# Patient Record
Sex: FEMALE | Race: WHITE | NOT HISPANIC OR LATINO | Employment: OTHER | URBAN - METROPOLITAN AREA
[De-identification: names, ages, dates, MRNs, and addresses within clinical notes are randomized per-mention and may not be internally consistent; named-entity substitution may affect disease eponyms.]

---

## 2017-09-15 ENCOUNTER — CONVERSION ENCOUNTER (OUTPATIENT)
Dept: GENERAL RADIOLOGY | Facility: HOSPITAL | Age: 62
End: 2017-09-15

## 2018-04-30 ENCOUNTER — OFFICE VISIT CONVERTED (OUTPATIENT)
Dept: FAMILY MEDICINE CLINIC | Facility: CLINIC | Age: 63
End: 2018-04-30
Attending: NURSE PRACTITIONER

## 2018-08-15 ENCOUNTER — OFFICE VISIT CONVERTED (OUTPATIENT)
Dept: FAMILY MEDICINE CLINIC | Facility: CLINIC | Age: 63
End: 2018-08-15
Attending: NURSE PRACTITIONER

## 2018-08-15 ENCOUNTER — CONVERSION ENCOUNTER (OUTPATIENT)
Dept: FAMILY MEDICINE CLINIC | Facility: CLINIC | Age: 63
End: 2018-08-15

## 2019-02-27 ENCOUNTER — OFFICE VISIT CONVERTED (OUTPATIENT)
Dept: FAMILY MEDICINE CLINIC | Facility: CLINIC | Age: 64
End: 2019-02-27
Attending: NURSE PRACTITIONER

## 2019-02-27 ENCOUNTER — CONVERSION ENCOUNTER (OUTPATIENT)
Dept: FAMILY MEDICINE CLINIC | Facility: CLINIC | Age: 64
End: 2019-02-27

## 2019-03-07 ENCOUNTER — HOSPITAL ENCOUNTER (OUTPATIENT)
Dept: LAB | Facility: HOSPITAL | Age: 64
Discharge: HOME | End: 2019-03-07
Attending: NURSE PRACTITIONER

## 2019-03-07 LAB
ALBUMIN SERPL-MCNC: 4.3 G/DL (ref 3.5–5)
ALBUMIN/GLOB SERPL: 1.7 {RATIO} (ref 1.4–2.6)
ALP SERPL-CCNC: 67 U/L (ref 43–160)
ALT SERPL-CCNC: 18 U/L (ref 10–40)
ANION GAP SERPL CALC-SCNC: 15 MMOL/L (ref 8–19)
AST SERPL-CCNC: 17 U/L (ref 15–50)
BASOPHILS # BLD AUTO: 0.05 10*3/UL (ref 0–0.2)
BASOPHILS NFR BLD AUTO: 1 % (ref 0–3)
BILIRUB SERPL-MCNC: 0.62 MG/DL (ref 0.2–1.3)
BUN SERPL-MCNC: 18 MG/DL (ref 5–25)
BUN/CREAT SERPL: 19 {RATIO} (ref 6–20)
CALCIUM SERPL-MCNC: 9.9 MG/DL (ref 8.7–10.4)
CHLORIDE SERPL-SCNC: 105 MMOL/L (ref 99–111)
CHOLEST SERPL-MCNC: 179 MG/DL (ref 107–200)
CHOLEST/HDLC SERPL: 2.5 {RATIO} (ref 3–6)
CONV ABS IMM GRAN: 0.01 10*3/UL (ref 0–0.2)
CONV CO2: 27 MMOL/L (ref 22–32)
CONV IMMATURE GRAN: 0.2 % (ref 0–1.8)
CONV TOTAL PROTEIN: 6.9 G/DL (ref 6.3–8.2)
CREAT UR-MCNC: 0.95 MG/DL (ref 0.5–0.9)
DEPRECATED RDW RBC AUTO: 48.8 FL (ref 36.4–46.3)
EOSINOPHIL # BLD AUTO: 0.19 10*3/UL (ref 0–0.7)
EOSINOPHIL # BLD AUTO: 3.9 % (ref 0–7)
ERYTHROCYTE [DISTWIDTH] IN BLOOD BY AUTOMATED COUNT: 13.3 % (ref 11.7–14.4)
GFR SERPLBLD BASED ON 1.73 SQ M-ARVRAT: >60 ML/MIN/{1.73_M2}
GLOBULIN UR ELPH-MCNC: 2.6 G/DL (ref 2–3.5)
GLUCOSE SERPL-MCNC: 93 MG/DL (ref 65–99)
HBA1C MFR BLD: 13.3 G/DL (ref 12–16)
HCT VFR BLD AUTO: 42.1 % (ref 37–47)
HDLC SERPL-MCNC: 71 MG/DL (ref 40–60)
LDLC SERPL CALC-MCNC: 94 MG/DL (ref 70–100)
LYMPHOCYTES # BLD AUTO: 1.12 10*3/UL (ref 1–5)
MCH RBC QN AUTO: 31.2 PG (ref 27–31)
MCHC RBC AUTO-ENTMCNC: 31.6 G/DL (ref 33–37)
MCV RBC AUTO: 98.8 FL (ref 81–99)
MONOCYTES # BLD AUTO: 0.42 10*3/UL (ref 0.2–1.2)
MONOCYTES NFR BLD AUTO: 8.6 % (ref 3–10)
NEUTROPHILS # BLD AUTO: 3.1 10*3/UL (ref 2–8)
NEUTROPHILS NFR BLD AUTO: 63.4 % (ref 30–85)
NRBC CBCN: 0 % (ref 0–0.7)
OSMOLALITY SERPL CALC.SUM OF ELEC: 298 MOSM/KG (ref 273–304)
PLATELET # BLD AUTO: 236 10*3/UL (ref 130–400)
PMV BLD AUTO: 11.9 FL (ref 9.4–12.3)
POTASSIUM SERPL-SCNC: 4.3 MMOL/L (ref 3.5–5.3)
RBC # BLD AUTO: 4.26 10*6/UL (ref 4.2–5.4)
SODIUM SERPL-SCNC: 143 MMOL/L (ref 135–147)
T4 FREE SERPL-MCNC: 1.4 NG/DL (ref 0.9–1.8)
TRIGL SERPL-MCNC: 69 MG/DL (ref 40–150)
TSH SERPL-ACNC: 2.66 M[IU]/L (ref 0.27–4.2)
VARIANT LYMPHS NFR BLD MANUAL: 22.9 % (ref 20–45)
VLDLC SERPL-MCNC: 14 MG/DL (ref 5–37)
WBC # BLD AUTO: 4.89 10*3/UL (ref 4.8–10.8)

## 2019-05-08 ENCOUNTER — HOSPITAL ENCOUNTER (OUTPATIENT)
Dept: GENERAL RADIOLOGY | Facility: HOSPITAL | Age: 64
Discharge: HOME | End: 2019-05-08
Attending: NURSE PRACTITIONER

## 2019-07-17 ENCOUNTER — OFFICE VISIT CONVERTED (OUTPATIENT)
Dept: FAMILY MEDICINE CLINIC | Facility: CLINIC | Age: 64
End: 2019-07-17
Attending: NURSE PRACTITIONER

## 2019-07-17 ENCOUNTER — CONVERSION ENCOUNTER (OUTPATIENT)
Dept: FAMILY MEDICINE CLINIC | Facility: CLINIC | Age: 64
End: 2019-07-17

## 2019-07-17 ENCOUNTER — HOSPITAL ENCOUNTER (OUTPATIENT)
Dept: FAMILY MEDICINE CLINIC | Facility: CLINIC | Age: 64
Discharge: HOME | End: 2019-07-17
Attending: NURSE PRACTITIONER

## 2019-07-19 LAB — BACTERIA UR CULT: NORMAL

## 2019-08-21 ENCOUNTER — OFFICE VISIT CONVERTED (OUTPATIENT)
Dept: FAMILY MEDICINE CLINIC | Facility: CLINIC | Age: 64
End: 2019-08-21
Attending: NURSE PRACTITIONER

## 2019-09-10 ENCOUNTER — HOSPITAL ENCOUNTER (OUTPATIENT)
Dept: GENERAL RADIOLOGY | Facility: HOSPITAL | Age: 64
Discharge: HOME | End: 2019-09-10
Attending: NURSE PRACTITIONER

## 2019-09-10 LAB
ALBUMIN SERPL-MCNC: 4.4 G/DL (ref 3.5–5)
ALBUMIN/GLOB SERPL: 1.6 {RATIO} (ref 1.4–2.6)
ALP SERPL-CCNC: 72 U/L (ref 43–160)
ALT SERPL-CCNC: 32 U/L (ref 10–40)
ANION GAP SERPL CALC-SCNC: 18 MMOL/L (ref 8–19)
AST SERPL-CCNC: 34 U/L (ref 15–50)
BASOPHILS # BLD AUTO: 0.04 10*3/UL (ref 0–0.2)
BASOPHILS NFR BLD AUTO: 0.9 % (ref 0–3)
BILIRUB SERPL-MCNC: 0.63 MG/DL (ref 0.2–1.3)
BUN SERPL-MCNC: 13 MG/DL (ref 5–25)
BUN/CREAT SERPL: 16 {RATIO} (ref 6–20)
CALCIUM SERPL-MCNC: 9.3 MG/DL (ref 8.7–10.4)
CHLORIDE SERPL-SCNC: 108 MMOL/L (ref 99–111)
CHOLEST SERPL-MCNC: 201 MG/DL (ref 107–200)
CHOLEST/HDLC SERPL: 2.7 {RATIO} (ref 3–6)
CONV ABS IMM GRAN: 0.01 10*3/UL (ref 0–0.2)
CONV CO2: 23 MMOL/L (ref 22–32)
CONV IMMATURE GRAN: 0.2 % (ref 0–1.8)
CONV TOTAL PROTEIN: 7.1 G/DL (ref 6.3–8.2)
CREAT UR-MCNC: 0.8 MG/DL (ref 0.5–0.9)
DEPRECATED RDW RBC AUTO: 52.9 FL (ref 36.4–46.3)
EOSINOPHIL # BLD AUTO: 0.33 10*3/UL (ref 0–0.7)
EOSINOPHIL # BLD AUTO: 7.1 % (ref 0–7)
ERYTHROCYTE [DISTWIDTH] IN BLOOD BY AUTOMATED COUNT: 14.6 % (ref 11.7–14.4)
GFR SERPLBLD BASED ON 1.73 SQ M-ARVRAT: >60 ML/MIN/{1.73_M2}
GLOBULIN UR ELPH-MCNC: 2.7 G/DL (ref 2–3.5)
GLUCOSE SERPL-MCNC: 93 MG/DL (ref 65–99)
HCT VFR BLD AUTO: 38.3 % (ref 37–47)
HDLC SERPL-MCNC: 74 MG/DL (ref 40–60)
HGB BLD-MCNC: 12.6 G/DL (ref 12–16)
LDLC SERPL CALC-MCNC: 115 MG/DL (ref 70–100)
LYMPHOCYTES # BLD AUTO: 1.22 10*3/UL (ref 1–5)
LYMPHOCYTES NFR BLD AUTO: 26.3 % (ref 20–45)
MCH RBC QN AUTO: 32.5 PG (ref 27–31)
MCHC RBC AUTO-ENTMCNC: 32.9 G/DL (ref 33–37)
MCV RBC AUTO: 98.7 FL (ref 81–99)
MONOCYTES # BLD AUTO: 0.53 10*3/UL (ref 0.2–1.2)
MONOCYTES NFR BLD AUTO: 11.4 % (ref 3–10)
NEUTROPHILS # BLD AUTO: 2.51 10*3/UL (ref 2–8)
NEUTROPHILS NFR BLD AUTO: 54.1 % (ref 30–85)
NRBC CBCN: 0 % (ref 0–0.7)
OSMOLALITY SERPL CALC.SUM OF ELEC: 298 MOSM/KG (ref 273–304)
PLATELET # BLD AUTO: 241 10*3/UL (ref 130–400)
PMV BLD AUTO: 12 FL (ref 9.4–12.3)
POTASSIUM SERPL-SCNC: 4.6 MMOL/L (ref 3.5–5.3)
RBC # BLD AUTO: 3.88 10*6/UL (ref 4.2–5.4)
SODIUM SERPL-SCNC: 144 MMOL/L (ref 135–147)
T4 FREE SERPL-MCNC: 1.2 NG/DL (ref 0.9–1.8)
TRIGL SERPL-MCNC: 60 MG/DL (ref 40–150)
TSH SERPL-ACNC: 2.87 M[IU]/L (ref 0.27–4.2)
VLDLC SERPL-MCNC: 12 MG/DL (ref 5–37)
WBC # BLD AUTO: 4.64 10*3/UL (ref 4.8–10.8)

## 2019-10-22 ENCOUNTER — HOSPITAL ENCOUNTER (OUTPATIENT)
Dept: LAB | Facility: HOSPITAL | Age: 64
Discharge: HOME | End: 2019-10-22
Attending: NURSE PRACTITIONER

## 2019-10-22 LAB
BASOPHILS # BLD AUTO: 0.03 10*3/UL (ref 0–0.2)
BASOPHILS NFR BLD AUTO: 0.5 % (ref 0–3)
CONV ABS IMM GRAN: 0.01 10*3/UL (ref 0–0.2)
CONV IMMATURE GRAN: 0.2 % (ref 0–1.8)
DEPRECATED RDW RBC AUTO: 51.2 FL (ref 36.4–46.3)
EOSINOPHIL # BLD AUTO: 0.2 10*3/UL (ref 0–0.7)
EOSINOPHIL # BLD AUTO: 3.6 % (ref 0–7)
ERYTHROCYTE [DISTWIDTH] IN BLOOD BY AUTOMATED COUNT: 13.9 % (ref 11.7–14.4)
HCT VFR BLD AUTO: 42.4 % (ref 37–47)
HGB BLD-MCNC: 13.6 G/DL (ref 12–16)
LYMPHOCYTES # BLD AUTO: 1.16 10*3/UL (ref 1–5)
LYMPHOCYTES NFR BLD AUTO: 21.1 % (ref 20–45)
MCH RBC QN AUTO: 32.2 PG (ref 27–31)
MCHC RBC AUTO-ENTMCNC: 32.1 G/DL (ref 33–37)
MCV RBC AUTO: 100.2 FL (ref 81–99)
MONOCYTES # BLD AUTO: 0.51 10*3/UL (ref 0.2–1.2)
MONOCYTES NFR BLD AUTO: 9.3 % (ref 3–10)
NEUTROPHILS # BLD AUTO: 3.58 10*3/UL (ref 2–8)
NEUTROPHILS NFR BLD AUTO: 65.3 % (ref 30–85)
NRBC CBCN: 0 % (ref 0–0.7)
PLATELET # BLD AUTO: 249 10*3/UL (ref 130–400)
PMV BLD AUTO: 11.5 FL (ref 9.4–12.3)
RBC # BLD AUTO: 4.23 10*6/UL (ref 4.2–5.4)
WBC # BLD AUTO: 5.49 10*3/UL (ref 4.8–10.8)

## 2019-11-11 ENCOUNTER — HOSPITAL ENCOUNTER (OUTPATIENT)
Dept: GENERAL RADIOLOGY | Facility: HOSPITAL | Age: 64
Discharge: HOME | End: 2019-11-11
Attending: NURSE PRACTITIONER

## 2020-02-20 ENCOUNTER — OFFICE VISIT CONVERTED (OUTPATIENT)
Dept: FAMILY MEDICINE CLINIC | Facility: CLINIC | Age: 65
End: 2020-02-20
Attending: NURSE PRACTITIONER

## 2020-02-20 ENCOUNTER — CONVERSION ENCOUNTER (OUTPATIENT)
Dept: FAMILY MEDICINE CLINIC | Facility: CLINIC | Age: 65
End: 2020-02-20

## 2020-02-25 ENCOUNTER — HOSPITAL ENCOUNTER (OUTPATIENT)
Dept: LAB | Facility: HOSPITAL | Age: 65
Discharge: HOME | End: 2020-02-25
Attending: NURSE PRACTITIONER

## 2020-02-25 LAB
ALBUMIN SERPL-MCNC: 4.5 G/DL (ref 3.5–5)
ALBUMIN/GLOB SERPL: 1.8 {RATIO} (ref 1.4–2.6)
ALP SERPL-CCNC: 69 U/L (ref 43–160)
ALT SERPL-CCNC: 24 U/L (ref 10–40)
ANION GAP SERPL CALC-SCNC: 17 MMOL/L (ref 8–19)
AST SERPL-CCNC: 20 U/L (ref 15–50)
BASOPHILS # BLD AUTO: 0.04 10*3/UL (ref 0–0.2)
BASOPHILS NFR BLD AUTO: 0.9 % (ref 0–3)
BILIRUB SERPL-MCNC: 0.54 MG/DL (ref 0.2–1.3)
BUN SERPL-MCNC: 17 MG/DL (ref 5–25)
BUN/CREAT SERPL: 19 {RATIO} (ref 6–20)
CALCIUM SERPL-MCNC: 9.5 MG/DL (ref 8.7–10.4)
CHLORIDE SERPL-SCNC: 105 MMOL/L (ref 99–111)
CHOLEST SERPL-MCNC: 179 MG/DL (ref 107–200)
CHOLEST/HDLC SERPL: 2.5 {RATIO} (ref 3–6)
CONV ABS IMM GRAN: 0.01 10*3/UL (ref 0–0.2)
CONV CO2: 24 MMOL/L (ref 22–32)
CONV IMMATURE GRAN: 0.2 % (ref 0–1.8)
CONV TOTAL PROTEIN: 7 G/DL (ref 6.3–8.2)
CREAT UR-MCNC: 0.91 MG/DL (ref 0.5–0.9)
DEPRECATED RDW RBC AUTO: 49.7 FL (ref 36.4–46.3)
EOSINOPHIL # BLD AUTO: 0.17 10*3/UL (ref 0–0.7)
EOSINOPHIL # BLD AUTO: 3.9 % (ref 0–7)
ERYTHROCYTE [DISTWIDTH] IN BLOOD BY AUTOMATED COUNT: 13.7 % (ref 11.7–14.4)
GFR SERPLBLD BASED ON 1.73 SQ M-ARVRAT: >60 ML/MIN/{1.73_M2}
GLOBULIN UR ELPH-MCNC: 2.5 G/DL (ref 2–3.5)
GLUCOSE SERPL-MCNC: 100 MG/DL (ref 65–99)
HCT VFR BLD AUTO: 40.5 % (ref 37–47)
HDLC SERPL-MCNC: 72 MG/DL (ref 40–60)
HGB BLD-MCNC: 13.1 G/DL (ref 12–16)
LDLC SERPL CALC-MCNC: 97 MG/DL (ref 70–100)
LYMPHOCYTES # BLD AUTO: 1.26 10*3/UL (ref 1–5)
LYMPHOCYTES NFR BLD AUTO: 29.2 % (ref 20–45)
MCH RBC QN AUTO: 31.6 PG (ref 27–31)
MCHC RBC AUTO-ENTMCNC: 32.3 G/DL (ref 33–37)
MCV RBC AUTO: 97.8 FL (ref 81–99)
MONOCYTES # BLD AUTO: 0.42 10*3/UL (ref 0.2–1.2)
MONOCYTES NFR BLD AUTO: 9.7 % (ref 3–10)
NEUTROPHILS # BLD AUTO: 2.42 10*3/UL (ref 2–8)
NEUTROPHILS NFR BLD AUTO: 56.1 % (ref 30–85)
NRBC CBCN: 0 % (ref 0–0.7)
OSMOLALITY SERPL CALC.SUM OF ELEC: 296 MOSM/KG (ref 273–304)
PLATELET # BLD AUTO: 236 10*3/UL (ref 130–400)
PMV BLD AUTO: 11.3 FL (ref 9.4–12.3)
POTASSIUM SERPL-SCNC: 4.4 MMOL/L (ref 3.5–5.3)
RBC # BLD AUTO: 4.14 10*6/UL (ref 4.2–5.4)
SODIUM SERPL-SCNC: 142 MMOL/L (ref 135–147)
T4 FREE SERPL-MCNC: 1.3 NG/DL (ref 0.9–1.8)
TRIGL SERPL-MCNC: 52 MG/DL (ref 40–150)
TSH SERPL-ACNC: 3.21 M[IU]/L (ref 0.27–4.2)
VLDLC SERPL-MCNC: 10 MG/DL (ref 5–37)
WBC # BLD AUTO: 4.32 10*3/UL (ref 4.8–10.8)

## 2020-02-26 LAB
CONV HEPATITIS C AB WITH REFLEX TO CONFIRMATION: <0.1 S/CO RATIO (ref 0–0.9)
CONV HEPATITIS COMMENT: NORMAL

## 2020-05-05 ENCOUNTER — TELEMEDICINE CONVERTED (OUTPATIENT)
Dept: FAMILY MEDICINE CLINIC | Facility: CLINIC | Age: 65
End: 2020-05-05
Attending: NURSE PRACTITIONER

## 2020-08-11 ENCOUNTER — HOSPITAL ENCOUNTER (OUTPATIENT)
Dept: LAB | Facility: HOSPITAL | Age: 65
Discharge: HOME | End: 2020-08-11
Attending: NURSE PRACTITIONER

## 2020-08-11 LAB
ALBUMIN SERPL-MCNC: 4.5 G/DL (ref 3.5–5)
ALBUMIN/GLOB SERPL: 1.7 {RATIO} (ref 1.4–2.6)
ALP SERPL-CCNC: 67 U/L (ref 43–160)
ALT SERPL-CCNC: 30 U/L (ref 10–40)
ANION GAP SERPL CALC-SCNC: 21 MMOL/L (ref 8–19)
AST SERPL-CCNC: 29 U/L (ref 15–50)
BASOPHILS # BLD AUTO: 0.05 10*3/UL (ref 0–0.2)
BASOPHILS NFR BLD AUTO: 1 % (ref 0–3)
BILIRUB SERPL-MCNC: 0.47 MG/DL (ref 0.2–1.3)
BUN SERPL-MCNC: 14 MG/DL (ref 5–25)
BUN/CREAT SERPL: 14 {RATIO} (ref 6–20)
CALCIUM SERPL-MCNC: 10.8 MG/DL (ref 8.7–10.4)
CHLORIDE SERPL-SCNC: 105 MMOL/L (ref 99–111)
CHOLEST SERPL-MCNC: 183 MG/DL (ref 107–200)
CHOLEST/HDLC SERPL: 2.7 {RATIO} (ref 3–6)
CONV ABS IMM GRAN: 0.01 10*3/UL (ref 0–0.2)
CONV CO2: 22 MMOL/L (ref 22–32)
CONV IMMATURE GRAN: 0.2 % (ref 0–1.8)
CONV TOTAL PROTEIN: 7.2 G/DL (ref 6.3–8.2)
CREAT UR-MCNC: 0.99 MG/DL (ref 0.5–0.9)
DEPRECATED RDW RBC AUTO: 52 FL (ref 36.4–46.3)
EOSINOPHIL # BLD AUTO: 0.24 10*3/UL (ref 0–0.7)
EOSINOPHIL # BLD AUTO: 4.9 % (ref 0–7)
ERYTHROCYTE [DISTWIDTH] IN BLOOD BY AUTOMATED COUNT: 14.1 % (ref 11.7–14.4)
GFR SERPLBLD BASED ON 1.73 SQ M-ARVRAT: 59 ML/MIN/{1.73_M2}
GLOBULIN UR ELPH-MCNC: 2.7 G/DL (ref 2–3.5)
GLUCOSE SERPL-MCNC: 100 MG/DL (ref 65–99)
HCT VFR BLD AUTO: 41.4 % (ref 37–47)
HDLC SERPL-MCNC: 68 MG/DL (ref 40–60)
HGB BLD-MCNC: 13.3 G/DL (ref 12–16)
LDLC SERPL CALC-MCNC: 100 MG/DL (ref 70–100)
LYMPHOCYTES # BLD AUTO: 1.3 10*3/UL (ref 1–5)
LYMPHOCYTES NFR BLD AUTO: 26.5 % (ref 20–45)
MCH RBC QN AUTO: 32 PG (ref 27–31)
MCHC RBC AUTO-ENTMCNC: 32.1 G/DL (ref 33–37)
MCV RBC AUTO: 99.5 FL (ref 81–99)
MONOCYTES # BLD AUTO: 0.45 10*3/UL (ref 0.2–1.2)
MONOCYTES NFR BLD AUTO: 9.2 % (ref 3–10)
NEUTROPHILS # BLD AUTO: 2.85 10*3/UL (ref 2–8)
NEUTROPHILS NFR BLD AUTO: 58.2 % (ref 30–85)
NRBC CBCN: 0 % (ref 0–0.7)
OSMOLALITY SERPL CALC.SUM OF ELEC: 297 MOSM/KG (ref 273–304)
PLATELET # BLD AUTO: 243 10*3/UL (ref 130–400)
PMV BLD AUTO: 12.1 FL (ref 9.4–12.3)
POTASSIUM SERPL-SCNC: 4.7 MMOL/L (ref 3.5–5.3)
RBC # BLD AUTO: 4.16 10*6/UL (ref 4.2–5.4)
SODIUM SERPL-SCNC: 143 MMOL/L (ref 135–147)
T4 FREE SERPL-MCNC: 1.3 NG/DL (ref 0.9–1.8)
TRIGL SERPL-MCNC: 74 MG/DL (ref 40–150)
TSH SERPL-ACNC: 3.47 M[IU]/L (ref 0.27–4.2)
VLDLC SERPL-MCNC: 15 MG/DL (ref 5–37)
WBC # BLD AUTO: 4.9 10*3/UL (ref 4.8–10.8)

## 2020-08-20 ENCOUNTER — TELEMEDICINE CONVERTED (OUTPATIENT)
Dept: FAMILY MEDICINE CLINIC | Facility: CLINIC | Age: 65
End: 2020-08-20
Attending: NURSE PRACTITIONER

## 2021-01-27 ENCOUNTER — TELEMEDICINE CONVERTED (OUTPATIENT)
Dept: FAMILY MEDICINE CLINIC | Facility: CLINIC | Age: 66
End: 2021-01-27
Attending: NURSE PRACTITIONER

## 2021-02-11 ENCOUNTER — TELEMEDICINE CONVERTED (OUTPATIENT)
Dept: FAMILY MEDICINE CLINIC | Facility: CLINIC | Age: 66
End: 2021-02-11
Attending: NURSE PRACTITIONER

## 2021-02-22 ENCOUNTER — CONVERSION ENCOUNTER (OUTPATIENT)
Dept: GASTROENTEROLOGY | Facility: CLINIC | Age: 66
End: 2021-02-22
Attending: INTERNAL MEDICINE

## 2021-03-09 ENCOUNTER — HOSPITAL ENCOUNTER (OUTPATIENT)
Dept: LAB | Facility: HOSPITAL | Age: 66
Discharge: HOME | End: 2021-03-09
Attending: NURSE PRACTITIONER

## 2021-03-09 LAB
ALBUMIN SERPL-MCNC: 4.3 G/DL (ref 3.5–5)
ALBUMIN/GLOB SERPL: 1.6 {RATIO} (ref 1.4–2.6)
ALP SERPL-CCNC: 63 U/L (ref 43–160)
ALT SERPL-CCNC: 14 U/L (ref 10–40)
ANION GAP SERPL CALC-SCNC: 15 MMOL/L (ref 8–19)
AST SERPL-CCNC: 18 U/L (ref 15–50)
BASOPHILS # BLD AUTO: 0.04 10*3/UL (ref 0–0.2)
BASOPHILS NFR BLD AUTO: 0.5 % (ref 0–3)
BILIRUB SERPL-MCNC: 0.52 MG/DL (ref 0.2–1.3)
BUN SERPL-MCNC: 15 MG/DL (ref 5–25)
BUN/CREAT SERPL: 18 {RATIO} (ref 6–20)
CALCIUM SERPL-MCNC: 10 MG/DL (ref 8.7–10.4)
CHLORIDE SERPL-SCNC: 103 MMOL/L (ref 99–111)
CHOLEST SERPL-MCNC: 153 MG/DL (ref 107–200)
CHOLEST/HDLC SERPL: 2.5 {RATIO} (ref 3–6)
CONV ABS IMM GRAN: 0.02 10*3/UL (ref 0–0.2)
CONV CO2: 26 MMOL/L (ref 22–32)
CONV IMMATURE GRAN: 0.2 % (ref 0–1.8)
CONV TOTAL PROTEIN: 7 G/DL (ref 6.3–8.2)
CREAT UR-MCNC: 0.85 MG/DL (ref 0.5–0.9)
DEPRECATED RDW RBC AUTO: 53.5 FL (ref 36.4–46.3)
EOSINOPHIL # BLD AUTO: 0.18 10*3/UL (ref 0–0.7)
EOSINOPHIL # BLD AUTO: 2.2 % (ref 0–7)
ERYTHROCYTE [DISTWIDTH] IN BLOOD BY AUTOMATED COUNT: 14.6 % (ref 11.7–14.4)
GFR SERPLBLD BASED ON 1.73 SQ M-ARVRAT: >60 ML/MIN/{1.73_M2}
GLOBULIN UR ELPH-MCNC: 2.7 G/DL (ref 2–3.5)
GLUCOSE SERPL-MCNC: 88 MG/DL (ref 65–99)
HCT VFR BLD AUTO: 38.9 % (ref 37–47)
HDLC SERPL-MCNC: 62 MG/DL (ref 40–60)
HGB BLD-MCNC: 12.6 G/DL (ref 12–16)
LDLC SERPL CALC-MCNC: 76 MG/DL (ref 70–100)
LYMPHOCYTES # BLD AUTO: 1.35 10*3/UL (ref 1–5)
LYMPHOCYTES NFR BLD AUTO: 16.2 % (ref 20–45)
MCH RBC QN AUTO: 31.9 PG (ref 27–31)
MCHC RBC AUTO-ENTMCNC: 32.4 G/DL (ref 33–37)
MCV RBC AUTO: 98.5 FL (ref 81–99)
MONOCYTES # BLD AUTO: 0.67 10*3/UL (ref 0.2–1.2)
MONOCYTES NFR BLD AUTO: 8 % (ref 3–10)
NEUTROPHILS # BLD AUTO: 6.09 10*3/UL (ref 2–8)
NEUTROPHILS NFR BLD AUTO: 72.9 % (ref 30–85)
NRBC CBCN: 0 % (ref 0–0.7)
OSMOLALITY SERPL CALC.SUM OF ELEC: 288 MOSM/KG (ref 273–304)
PLATELET # BLD AUTO: 250 10*3/UL (ref 130–400)
PMV BLD AUTO: 12.1 FL (ref 9.4–12.3)
POTASSIUM SERPL-SCNC: 4.6 MMOL/L (ref 3.5–5.3)
RBC # BLD AUTO: 3.95 10*6/UL (ref 4.2–5.4)
SODIUM SERPL-SCNC: 139 MMOL/L (ref 135–147)
T4 FREE SERPL-MCNC: 1.4 NG/DL (ref 0.9–1.8)
TRIGL SERPL-MCNC: 73 MG/DL (ref 40–150)
TSH SERPL-ACNC: 1.58 M[IU]/L (ref 0.27–4.2)
VLDLC SERPL-MCNC: 15 MG/DL (ref 5–37)
WBC # BLD AUTO: 8.35 10*3/UL (ref 4.8–10.8)

## 2021-03-23 ENCOUNTER — HOSPITAL ENCOUNTER (OUTPATIENT)
Dept: LAB | Facility: HOSPITAL | Age: 66
Discharge: HOME | End: 2021-03-23
Attending: NURSE PRACTITIONER

## 2021-03-23 LAB
FOLATE SERPL-MCNC: 15.2 NG/ML (ref 4.8–20)
IRON SATN MFR SERPL: 28 % (ref 20–55)
IRON SERPL-MCNC: 90 UG/DL (ref 60–170)
TIBC SERPL-MCNC: 326 UG/DL (ref 245–450)
TRANSFERRIN SERPL-MCNC: 228 MG/DL (ref 250–380)
VIT B12 SERPL-MCNC: 466 PG/ML (ref 211–911)

## 2021-05-10 ENCOUNTER — HOSPITAL ENCOUNTER (OUTPATIENT)
Dept: GENERAL RADIOLOGY | Facility: HOSPITAL | Age: 66
Discharge: HOME | End: 2021-05-10
Attending: NURSE PRACTITIONER

## 2021-05-10 NOTE — H&P
History and Physical      Patient Name: Charla Holman   Patient ID: [id number]   Sex: Female   YOB: 1955    Primary Care Provider: Marylou BHATIA   Referring Provider: Marylou BHATIA    Visit Date: 2021    Provider: Ravinder Uriarte MD   Location: [facility]   Location Address: [address] [place] [address]  [id number]   Location Phone: [phone]          Chief Complaint  · Surgical History and Physical  · Screening Colonoscopy      History Of Present Illness  NON-INPATIENT HISTORY AND PHYSICAL  Allergies: Codeine Phosphate, Codeine Sulfate, and Macrobid   Chief Complaint/History of Present Illness: Colonoscopy History of colon polyps, Family history of colon cancer   Colon Recall: Yes How Lon years   Failed Outpatient Treatment/Contraindications: N/A   Current Medications: albuterol sulfate 90 mcg/actuation inhalation HFA aerosol inhaler, Areds Vitamin, aspirin 81 mg Oral tablet,delayed release (DR/EC), Ativan 1 mg oral tablet, Calcium 600 600 mg (1,500 mg) Oral tablet, Co Q-10 100 mg oral capsule, Combigan 0.2-0.5 % ophthalmic (eye) drops, Flonase Allergy Relief 50 mcg/actuation nasal spray,suspension, Fosamax 70 mg oral tablet, Lumigan 0.01 % ophthalmic (eye) drops, NuLYTELY with Flavor Packs 420 gram oral recon soln, Zocor 10 mg oral tablet, and Zyrtec 10 mg oral tablet   Significant Past Medical History: Anxiety, Asthma, Breast Neoplasm, Malignant, Cancer, Cystitis (Acute), Hematuria (Microscopic), High cholesterol, Hyperlipemia, Limb Swelling, Screening Mammogram, and UTI   Significant Family Medical History: Family history of colon cancer: Mother   Significant Past Surgical History: Appendectomy, Cholecystectomy, Colonoscopy, Eye Implant, Gallbladder, Hysterectomy, and Mastectomy, left breast   Previous Colonoscopy: Yes YEAR:  By Whom: Brandan Bray MD   Previous EGD: No   PHYSICAL EXAM:  Heart: Regular Rate and Rhythm   Lungs: Breathing  Unlabored           Assessment  · Preoperative examination     V72.84/Z01.818  · Screening for colon cancer     V76.51/Z12.11  · History of colon polyps     V12.72/Z86.010  · Family history of colon cancer in mother     V16.0/Z80.0      Plan  · Orders  o Consent for Colonoscopy with Possible Biopsy - Possible risks/complications, benefits, and alternatives to surgical or invasive procedure have been explained to patient and/or legal guardian. -Patient has been evaluated and can tolerate anesthesia and/or sedation. Risks, benefits, and alternatives to anesthesia and sedation have been explained to patient and/or legal guardian. (80028) - V72.84/Z01.818, V76.51/Z12.11, V12.72/Z86.010, V16.0/Z80.0 - 06/11/2021  · Medications  o Medications have been Reconciled  o Transition of Care or Provider Policy  · Instructions  o ****Surgical Orders****  o ***************  o Outpatient  o ***************  o RISK AND BENEFITS:  o Possible risks/complications, benefits and alternatives to surgical or invasive procedure have been explained to the patient and/or legal guardian.  o Patient has been evaluated and can tolerate anesthesia and/or sedation. Risks, benefits, and alternatives to anesthesia and sedation have been explained to the patient and/or legal guardian.  o ***************  o PREP: Per protocol  o IV: Per Anesthesia  o The above History and Physical Examination has been completed within 30 days of admission.  o This note has been transcribed by JUSTYNA Winters. I have read and agree with the findings in this note.  o Electronically Identified Patient Education Materials Provided Electronically            Electronically Signed by: Rosy Dave MA -Author on February 22, 2021 09:21:25 AM

## 2021-05-13 NOTE — PROGRESS NOTES
Progress Note      Patient Name: Charla Holman   Patient ID: [id number]   Sex: Female   YOB: 1955    Primary Care Provider: Marylou BHATIA   Referring Provider: Marylou BHATIA    Visit Date: May 5, 2020    Provider: SRIRAM Laboy   Location: [facility]   Location Address: [address] [place] [address], [address]  [place], [place]  [id number]   Location Phone: [phone]          Chief Complaint  · possible UTI      History Of Present Illness  Video Conferencing Visit  Charla Holman is a 64 year old /White female who is presenting for evaluation via video conferencing. Verbal consent obtained before beginning visit.   Charla Holman is a 64 year old /White female who presents for evaluation and treatment of:      Pt consented to telehealth visit via [employer]. She has a possible UTI, S/O are burning with urination and bladder hurting for the last two weeks. She has been drinking a lot and taking AZO OTC for relief.           Past Medical History  Disease Name Date Onset Notes   Anxiety --  --    Asthma 02/21/2014 --    Breast Neoplasm, Malignant --  --    Cancer --  --    Cystitis (Acute) 09/19/2014 --    Hematuria (Microscopic) --  --    High cholesterol 02/21/2014 --    Hyperlipemia --  --    Limb Swelling --  --    Screening Mammogram 5/8/19 --    UTI --  --          Past Surgical History  Procedure Name Date Notes   Appendectomy 2006 --    Cholecystectomy 2000 --    Colonoscopy 1/11/2016 Dr. Brandan Bray   Eye Implant --  yes   Gallbladder --  --    Hysterectomy --  partial   Mastectomy, left breast --  --          Medication List  Name Date Started Instructions   albuterol sulfate 90 mcg/actuation inhalation HFA aerosol inhaler 10/14/2019 INHALE ONE TO TWO PUFFS BY MOUTH EVERY 4 TO 6 HOURS AS NEEDED   aspirin 81 mg Oral tablet,delayed release (/EC)  take 1 tablet (81 mg) by oral route once daily   Ativan 1 mg oral tablet 04/27/2020 take 1/2-1 tablet (1 mg) by oral  route 3 times per day as needed   Calcium 600 600 mg (1,500 mg) Oral tablet  take 1 tablet by oral route daily   Co Q-10 100 mg oral capsule 2017 take 1 capsule by oral route daily   Combigan 0.2-0.5 % ophthalmic (eye) drops  instill 1 drop into affected eye(s) by ophthalmic route every 12 hours   Flonase Allergy Relief 50 mcg/actuation nasal spray,suspension 2020 spray 1 - 2 sprays (50 - 100 mcg) in each nostril by intranasal route once BID   Fosamax 70 mg oral tablet 2019 take 1 tablet by oral route once a week for 90 days   Zocor 10 mg oral tablet 2020 take 1 tablet (10 mg) by oral route once daily in the evening for 90 days   Zyrtec 10 mg oral tablet 2018 take 1 tablet (10 mg) by oral route once daily         Allergy List  Allergen Name Date Reaction Notes   Codeine Phosphate --  --  --    Codeine Sulfate --  --  --    Macrobid 14 --  trouble breathing per pt       Allergies Reconciled  Family Medical History  Disease Name Relative/Age Notes   Breast Neoplasm, Malignant Sister/   --    Heart Disease Mother/   Mother   Cancer, Unspecified Mother/   Mother  Mother; Sister   Diabetes, unspecified type Father/   Father   Hypertension Father/   --    Colon Cancer Mother/   --          Reproductive History  Menstrual   Age Menarche: 11   Pregnancy Summary   Total Pregnancies: 2 Full Term: 2 Premature: 0   Ab Induced: 0 Ab Spontaneous: 0 Ectopics: 0   Multiples: 0 Livin         Social History  Finding Status Start/Stop Quantity Notes   Active but no formal exercise --  --/-- --  --    Alcohol Never --/-- --  --    Alcohol Use Current some day --/-- --  rarely drinks   Claustophobic Unknown --/-- --  yes   lives with spouse --  --/-- --  --     --  --/-- --  --    . --  --/-- --  --    No known infection risk --  --/-- --  --    Recreational Drug Use Never --/-- --  no   Retired. --  --/-- --  --    Tobacco Never --/-- --  never smoker   Working --  --/-- --  --           Immunizations  NameDate Admin Mfg Trade Name Lot Number Route Inj VIS Given VIS Publication   Ivwoiqazo69/01/2019 NE Not Entered  NE NE     Comments:    Tccocmxrs33/16/2018 NE Flucelvax  NE NE     Comments: Injection given at [facility]. Record scanned.   Cgrnbpmqs56/18/2016 SKB Fluarix, quadrivalent, preservative free [address] 10/18/2016 08/07/2015   Comments: Injection given. Pt tolerated well.   Tsvkmykqf39/20/2014 SKB Fluarix, quadrivalent, preservative free 2A2KX IM RD 10/20/2014 07/02/2012   Comments: tolerated well   Zojtlkqpk00/01/2013 NE Not Entered  NE NE 11/18/2013    Comments:    Evjlyailvozw18/01/2007 MSD PNEUMOVAX 23  NE NE 03/26/2015 10/06/2009   Comments:    Tdap05/17/2016 SKB BOOSTRIX AH4LF IM RA 05/17/2016 01/24/2012   Comments:          Review of Systems  · Constitutional  o Denies  o : fever, fatigue, weight loss, weight gain  · Cardiovascular  o Denies  o : lower extremity edema, claudication, chest pressure, palpitations  · Respiratory  o Denies  o : shortness of breath, wheezing, cough, hemoptysis, dyspnea on exertion  · Gastrointestinal  o Denies  o : nausea, vomiting, diarrhea, constipation, abdominal pain  · Genitourinary  o Admits  o : urgency, frequency, dysuria, hematuria      Physical Examination  · Constitutional  o Appearance  o : well-nourished, well developed, alert, in no acute distress  · Psychiatric  o Mood and Affect  o : mood normal, affect appropriate, denies any SI/HI          Assessment  · Urinary tract infection     599.0/N39.0  · Anxiety     300.02/F41.1  · Hyperlipemia     272.4/E78.5  · Urine frequency     788.41/R35.0  · Dysuria     788.1/R30.0    Problems Reconciled  Plan  · Orders  o ACO-39: Current medications updated and reviewed () - - 05/05/2020  · Medications  o Macrobid 100 mg oral capsule   SIG: take 1 capsule (100 mg) by oral route every 12 hours with food for 7 days   DISP: (14) capsules with 0 refills  Prescribed on 05/05/2020      o Medications have been Reconciled  o Transition of Care or Provider Policy  · Instructions  o Increase fluid intake. If you develop fever, chills, N/V, flank pain, or worsening of your symptoms return to the office or go to the ER.  o Patient instructed to seek medical attention urgently for new or worsening symptoms.  o Call the office with any concerns or questions.  · Disposition  o Call or Return if symptoms worsen or persist.            Electronically Signed by: SRIRAM Laboy -Author on May 5, 2020 01:45:16 PM

## 2021-05-13 NOTE — PROGRESS NOTES
Progress Note      Patient Name: Charla Holman   Patient ID: [id number]   Sex: Female   YOB: 1955    Primary Care Provider: Marylou BHATIA   Referring Provider: Marylou BHATIA    Visit Date: August 20, 2020    Provider: SRIRAM Laboy   Location: [facility]   Location Address: [address] [place] [address], [address]  [place], [place]  [id number]   Location Phone: [phone]          Chief Complaint  · F/U      History Of Present Illness  Video Conferencing Visit  Charla Holman is a 65 year old /White female who is presenting for evaluation via video conferencing via [employer]. Verbal consent obtained before beginning visit.   The following staff were present during this visit: SRIRAM Laboy and CARLI Coppola   Charla Holman is a 65 year old /White female who presents for evaluation and treatment of:      Pt is f/u for medication refills. Pt states BP was 128/73 HR 86 and weight 152lbs this morning. No issues to report at this time.     Pt states she will come in, in Oct to get flu and TB skin test.    Pt declines MAW visit.    Pt is due UDS. Pt aware. Pt will need refill on Ativan.       Past Medical History  Disease Name Date Onset Notes   Anxiety --  --    Asthma 02/21/2014 --    Breast Neoplasm, Malignant --  --    Cancer --  --    Cystitis (Acute) 09/19/2014 --    Hematuria (Microscopic) --  --    High cholesterol 02/21/2014 --    Hyperlipemia --  --    Limb Swelling --  --    Screening Mammogram 5/8/19 --    UTI --  --          Past Surgical History  Procedure Name Date Notes   Appendectomy 2006 --    Cholecystectomy 2000 --    Colonoscopy 1/11/2016 Dr. Brandan Bray   Eye Implant --  yes   Gallbladder --  --    Hysterectomy --  partial   Mastectomy, left breast --  --          Medication List  Name Date Started Instructions   albuterol sulfate 90 mcg/actuation inhalation HFA aerosol inhaler 08/20/2020 INHALE ONE TO TWO PUFFS BY MOUTH EVERY 4 TO 6 HOURS  AS NEEDED   Areds Vitamin  Take one by mouth BID   aspirin 81 mg Oral tablet,delayed release (DR/EC)  take 1 tablet (81 mg) by oral route once daily   Ativan 1 mg oral tablet 2020 take 1/2-1 tablet (1 mg) by oral route 3 times per day as needed   Calcium 600 600 mg (1,500 mg) Oral tablet  take 1 tablet by oral route daily   Co Q-10 100 mg oral capsule 2017 take 1 capsule by oral route daily   Combigan 0.2-0.5 % ophthalmic (eye) drops  instill 1 drop into affected eye(s) by ophthalmic route every 12 hours   Flonase Allergy Relief 50 mcg/actuation nasal spray,suspension 2020 spray 1 - 2 sprays (50 - 100 mcg) in each nostril by intranasal route once BID   Fosamax 70 mg oral tablet 2020 take 1 tablet by oral route once a week for 90 days   Lumigan 0.01 % ophthalmic (eye) drops  instill 1 drop in affected eye daily   Zocor 10 mg oral tablet 2020 take 1 tablet (10 mg) by oral route once daily in the evening for 90 days   Zyrtec 10 mg oral tablet 2018 take 1 tablet (10 mg) by oral route once daily         Allergy List  Allergen Name Date Reaction Notes   Codeine Phosphate --  --  --    Codeine Sulfate --  --  --    Macrobid 14 --  trouble breathing per pt         Family Medical History  Disease Name Relative/Age Notes   Breast Neoplasm, Malignant Sister/   --    Heart Disease Mother/   Mother   Cancer, Unspecified Mother/   Mother  Mother; Sister   Diabetes, unspecified type Father/   Father   Hypertension Father/   --    Colon Cancer Mother/   --          Reproductive History  Menstrual   Age Menarche: 11   Pregnancy Summary   Total Pregnancies: 2 Full Term: 2 Premature: 0   Ab Induced: 0 Ab Spontaneous: 0 Ectopics: 0   Multiples: 0 Livin         Social History  Finding Status Start/Stop Quantity Notes   Active but no formal exercise --  --/-- --  --    Alcohol Never --/-- --  --    Alcohol Use Current some day --/-- --  rarely drinks   Claustophobic Unknown --/-- --  yes    lives with spouse --  --/-- --  --     --  --/-- --  --    . --  --/-- --  --    No known infection risk --  --/-- --  --    Recreational Drug Use Never --/-- --  no   Retired. --  --/-- --  --    Tobacco Never --/-- --  never smoker   Working --  --/-- --  --          Immunizations  NameDate Admin Mfg Trade Name Lot Number Route Inj VIS Given VIS Publication   Ekpdhlzyu09/01/2019 NE Not Entered  NE NE     Comments:    Doratdygg51/16/2018 NE Flucelvax  NE NE     Comments: Injection given at [facility]. Record scanned.   Kxvyoukbb85/18/2016 SKB Fluarix, quadrivalent, preservative free [address] IM [address] 10/18/2016 08/07/2015   Comments: Injection given. Pt tolerated well.   Euztolkuc49/20/2014 SKB Fluarix, quadrivalent, preservative free 2A2KX IM RD 10/20/2014 07/02/2012   Comments: tolerated well   Ssgmawaks07/01/2013 NE Not Entered  NE NE 11/18/2013    Comments:    Mqbbhlesigts35/01/2007 MSD PNEUMOVAX 23  NE NE 03/26/2015 10/06/2009   Comments:    Tdap05/17/2016 SKB BOOSTRIX AH4LF IM RA 05/17/2016 01/24/2012   Comments:          Review of Systems  · Constitutional  o Denies  o : fever, fatigue, weight loss, weight gain  · Breasts  o Denies  o : lumps, tenderness, swelling, nipple discharge  · Cardiovascular  o Denies  o : lower extremity edema, claudication, chest pressure, palpitations  · Respiratory  o Denies  o : shortness of breath, wheezing, cough, hemoptysis, dyspnea on exertion  · Gastrointestinal  o Denies  o : nausea, vomiting, diarrhea, constipation, abdominal pain  · Psychiatric  o Admits  o : anxiety      Physical Examination  · Constitutional  o Appearance  o : well-nourished, well developed, alert, in no acute distress  · Neurologic  o Mental Status Examination  o :   § Orientation  § : grossly oriented to person, place and time  · Psychiatric  o Mood and Affect  o : mood normal, affect appropriate, denies any SI/HI          Assessment  · Allergic rhinitis due to  "allergen     477.9/J30.9  · Anxiety disorder     300.00/F41.9  · Asthma     493.90/J45.909  · Essential hypertension     401.9/I10  · Hyperlipidemia     272.4/E78.5  · Visit for screening mammogram     V76.12/Z12.31  · Breast Neoplasm, Malignant     174.9  · Osteopenia     733.90/M85.80      Plan  · Orders  o Screening Mammography; Bilateral 3D (80220, 80997, ) - V76.12/Z12.31 - 08/20/2020  o ACO-39: Current medications updated and reviewed () - - 08/20/2020  · Medications  o albuterol sulfate 90 mcg/actuation inhalation HFA aerosol inhaler   SIG: INHALE ONE TO TWO PUFFS BY MOUTH EVERY 4 TO 6 HOURS AS NEEDED   DISP: (18) Unspecified with 1 refills  Refilled on 08/20/2020     o Fosamax 70 mg oral tablet   SIG: take 1 tablet by oral route once a week for 90 days   DISP: (13) tablets with 3 refills  Refilled on 08/20/2020     o Zocor 10 mg oral tablet   SIG: take 1 tablet (10 mg) by oral route once daily in the evening for 90 days   DISP: (90) tablets with 1 refills  Refilled on 08/20/2020     o Medications have been Reconciled  o Transition of Care or Provider Policy  · Instructions  o Patient was given an SSRI/SSNRI medication and warned of possible side effects of the medication including potential for increased risk of suicidal thoughts and feelings. Patient was instructed that if they begin to exhibit any of these effects they will discontinue the medication immediately and contact our office or the ER ASAP.  o Patient agrees to a \"No Self Harm\" contract. Patient will either call , [facility], ER, [facility], [facility].  o Advised that cheeses and other sources of dairy fats, animal fats, fast food, and the extras (candy, pastries, pies, doughnuts and cookies) all contain LDL raising nutrients. Advised to increase fruits, vegetables, whole grains, and to monitor portion sizes.   o Obtained a written consent for JEFFREY query. Discussed the risk and benefits of the use of " controlled substances with the patient, including the risk of tolerance and drug dependence. The patient has been counseled on the need to have an exit strategy, including potentially discontinuing the use of controlled substances. JEFFREY has or will be reviewed as soon as it becomes avaliable.  o Patient was educated/instructed on their diagnosis, treatment and medications prior to discharge from the clinic today.  o Patient instructed to seek medical attention urgently for new or worsening symptoms.  o Call the office with any concerns or questions.  o will refill Ativan after pt comes in and has a clean UDS  · Disposition  o Return Visit Request in/on 6 months +/- 2 weeks ([id number]).            Electronically Signed by: SRIRAM Laboy -Author on August 20, 2020 10:03:59 AM

## 2021-05-14 NOTE — PROGRESS NOTES
Progress Note      Patient Name: Charla Holman   Patient ID: [id number]   Sex: Female   YOB: 1955    Primary Care Provider: Marylou BHATIA   Referring Provider: Marylou BHATIA    Visit Date: January 27, 2021    Provider: SRIRAM Laboy   Location: [facility]   Location Address: [address] [place] [address], [address]  [place], [place]  [id number]   Location Phone: [phone]          Chief Complaint  · headache  · elevated blood pressure      History Of Present Illness  Video Conferencing Visit  Charla Holman is a 65 year old /White female who is presenting for evaluation via video conferencing via [email or web address]. Verbal consent obtained before beginning visit.   The following staff were present during this visit: Betsy Mensah MA   Charla Holman is a 65 year old /White female who presents for evaluation and treatment of:      Pt for the last 5 days she has had a HA, she thought was related to her sinus. She started checking her blood pressure and noticed that it was elevated, it has been running in the 160/90 range. She went to the ER yesterday for this issue, her BP there was 189/103.  She had a head CT done and it was WNL. Pt does not take blood pressure medication currently.    Pt is scheduled for a Mammogram, Colonoscopy is in the chart, and is due for labs.    She is due colonoscopy in June and wants to see Dr. Lares she saw Dr. Bray previously.       Past Medical History  Disease Name Date Onset Notes   Anxiety --  --    Asthma 02/21/2014 --    Breast Neoplasm, Malignant --  --    Cancer --  --    Cystitis (Acute) 09/19/2014 --    Hematuria (Microscopic) --  --    High cholesterol 02/21/2014 --    Hyperlipemia --  --    Limb Swelling --  --    Screening Mammogram 5/8/19 --    UTI --  --          Past Surgical History  Procedure Name Date Notes   Appendectomy 2006 --    Cholecystectomy 2000 --    Colonoscopy 1/11/2016 Dr. Brandan Bray   Eye Implant --   yes   Gallbladder --  --    Hysterectomy --  partial   Mastectomy, left breast --  --          Medication List  Name Date Started Instructions   albuterol sulfate 90 mcg/actuation inhalation HFA aerosol inhaler 08/20/2020 INHALE ONE TO TWO PUFFS BY MOUTH EVERY 4 TO 6 HOURS AS NEEDED   Areds Vitamin  Take one by mouth BID   aspirin 81 mg Oral tablet,delayed release (DR/EC)  take 1 tablet (81 mg) by oral route once daily   Ativan 1 mg oral tablet 09/08/2020 take 1/2-1 tablet (1 mg) by oral route 3 times per day as needed   Calcium 600 600 mg (1,500 mg) Oral tablet  take 1 tablet by oral route daily   Co Q-10 100 mg oral capsule 03/21/2017 take 1 capsule by oral route daily   Combigan 0.2-0.5 % ophthalmic (eye) drops  instill 1 drop into affected eye(s) by ophthalmic route every 12 hours   Flonase Allergy Relief 50 mcg/actuation nasal spray,suspension 02/20/2020 spray 1 - 2 sprays (50 - 100 mcg) in each nostril by intranasal route once BID   Fosamax 70 mg oral tablet 08/20/2020 take 1 tablet by oral route once a week for 90 days   Lumigan 0.01 % ophthalmic (eye) drops  instill 1 drop in affected eye daily   Zocor 10 mg oral tablet 08/20/2020 take 1 tablet (10 mg) by oral route once daily in the evening for 90 days   Zyrtec 10 mg oral tablet 04/30/2018 take 1 tablet (10 mg) by oral route once daily         Allergy List  Allergen Name Date Reaction Notes   Codeine Phosphate --  --  --    Codeine Sulfate --  --  --    Macrobid 6/1/14 --  trouble breathing per pt         Family Medical History  Disease Name Relative/Age Notes   Breast Neoplasm, Malignant Sister/   --    Heart Disease Mother/   Mother   Cancer, Unspecified Mother/   Mother  Mother; Sister   Diabetes, unspecified type Father/   Father   Hypertension Father/   --    Colon Cancer Mother/   --          Reproductive History  Menstrual   Age Menarche: 11   Pregnancy Summary   Total Pregnancies: 2 Full Term: 2 Premature: 0   Ab Induced: 0 Ab Spontaneous: 0  Ectopics: 0   Multiples: 0 Livin         Social History  Finding Status Start/Stop Quantity Notes   Active but no formal exercise --  --/-- --  --    Alcohol Never --/-- --  --    Alcohol Use Current some day --/-- --  rarely drinks   Claustophobic Unknown --/-- --  yes   lives with spouse --  --/-- --  --     --  --/-- --  --    . --  --/-- --  --    No known infection risk --  --/-- --  --    Recreational Drug Use Never --/-- --  no   Retired. --  --/-- --  --    Tobacco Never --/-- --  never smoker   Working --  --/-- --  --          Immunizations  NameDate Admin Mfg Trade Name Lot Number Route Inj VIS Given VIS Publication   Ltekfqmvo58/01/2019 NE Not Entered  NE NE     Comments:    Ocmbybsqkhys50/2007 MSD PNEUMOVAX 23  NE NE 2015 10/06/2009   Comments:    Tdap2016 SKB BOOSTRIX AH4LF IM RA 2016   Comments:          Review of Systems  · Constitutional  o Denies  o : fever, fatigue, weight loss, weight gain  · HENT  o Admits  o : headaches  · Cardiovascular  o Denies  o : lower extremity edema, claudication, chest pressure, palpitations  · Respiratory  o Denies  o : shortness of breath, wheezing, cough, hemoptysis, dyspnea on exertion  · Gastrointestinal  o Denies  o : nausea, vomiting, diarrhea, constipation, abdominal pain  · Psychiatric  o Admits  o : anxiety      Physical Examination  · Constitutional  o Appearance  o : well-nourished, well developed, alert, in no acute distress  · Neurologic  o Mental Status Examination  o :   § Orientation  § : grossly oriented to person, place and time  · Psychiatric  o Mood and Affect  o : mood normal, affect appropriate          Assessment  · Asthma     493.90/J45.909  · Essential hypertension     401.9/I10  · Headache     784.0/R51  · Screening for colon cancer     V76.51/Z12.11  · Anxiety     300.02/F41.1  · Hyperlipemia     272.4/E78.5  · Screening for thyroid disorder     V77.0/Z13.29      Plan  · Orders  o CBC with  Auto Diff [facility] (79863) - - 01/27/2021  o CMP [facility] (92797) - - 01/27/2021  o Lipid Panel [facility] (43871) - 272.4/E78.5 - 01/27/2021  o Thyroid Profile (54824, 60375, THYII) - V77.0/Z13.29 - 01/27/2021  o ACO-19: Colorectal cancer screening results documented and reviewed (3017F) - - 01/27/2021  o ACO-39: Current medications updated and reviewed (1159F, ) - - 01/27/2021  o GASTROENTEROLOGY (GASTR) - - 01/27/2021   Dr. Lares in May or June  · Medications  o Zestoretic 10-12.5 mg oral tablet   SIG: take 1 tablet by oral route once daily for 30 days   DISP: (30) Tablet with 0 refills  Prescribed on 01/27/2021     o Medications have been Reconciled  o Transition of Care or Provider Policy  · Instructions  o Patient was educated/instructed on their diagnosis, treatment and medications prior to discharge from the clinic today.  o advised pt to keep a bp log and bring to f/u appt  · Disposition  o Return Visit Request in/on 2 weeks +/- 2 weeks (14306).            Electronically Signed by: SRIRAM Laboy -Author on January 27, 2021 09:31:31 AM

## 2021-05-14 NOTE — PROGRESS NOTES
Progress Note      Patient Name: Charla Holman   Patient ID: [id number]   Sex: Female   YOB: 1955    Primary Care Provider: Marylou BHATIA   Referring Provider: Marylou BHATIA    Visit Date: February 11, 2021    Provider: SRIRAM Laboy   Location: [facility]   Location Address: [address] [place] [address], [address]  [place], [place]  [id number]   Location Phone: [phone]          Chief Complaint  · 6 month f/u      History Of Present Illness  Video Conferencing Visit  Charla Holman is a 65 year old /White female who is presenting for evaluation via video conferencing via [employer]. Verbal consent obtained before beginning visit.   The following staff were present during this visit: SRIRAM Laboy   Charla Holman is a 65 year old /White female who presents for evaluation and treatment of:      Pt present for 6 month f/u. She has no concerns today. But needs refills on her Ativan, Fosamax and Zocor sent to [facility].  She is due labs and a mammogram.  She already has an order for a mammo and is scheduled for May 2021.  She states she is not taking her BP medication as it was dropping her BP too low.  It has been running consistently 120s/60-70s.  She will continue to monitor.  She has been taking 1/2 of Ativan on occasion about 3-4 times per week that seems to really help calm her nevers and she wonders if her HTN was from anxiety.    She declines MAW exam today.    UDS:  9/8/20  Colonoscopy:  1/11/16  Dexa:  11/11/2019    She is inquiring about a Mastectomy form and Bra and wonders if Medicare will cover the cost.  I  will consult Kemi Smith, PT at [email or web address] about this.  She has had a left sided Mastectomy due to Breast CA.       Past Medical History  Disease Name Date Onset Notes   Anxiety --  --    Asthma 02/21/2014 --    Breast Neoplasm, Malignant --  --    Cancer --  --    Cystitis (Acute) 09/19/2014 --    Hematuria  (Microscopic) --  --    High cholesterol 02/21/2014 --    Hyperlipemia --  --    Limb Swelling --  --    Screening Mammogram 5/8/19 --    UTI --  --          Past Surgical History  Procedure Name Date Notes   Appendectomy 2006 --    Cholecystectomy 2000 --    Colonoscopy 1/11/2016 Dr. Brandan Bray   Eye Implant --  yes   Gallbladder --  --    Hysterectomy --  partial   Mastectomy, left breast --  --          Medication List  Name Date Started Instructions   albuterol sulfate 90 mcg/actuation inhalation HFA aerosol inhaler 08/20/2020 INHALE ONE TO TWO PUFFS BY MOUTH EVERY 4 TO 6 HOURS AS NEEDED   Areds Vitamin  Take one by mouth BID   aspirin 81 mg Oral tablet,delayed release (DR/EC)  take 1 tablet (81 mg) by oral route once daily   Ativan 1 mg oral tablet 02/11/2021 take 1/2-1 tablet (1 mg) by oral route 3 times per day as needed   Calcium 600 600 mg (1,500 mg) Oral tablet  take 1 tablet by oral route daily   Co Q-10 100 mg oral capsule 03/21/2017 take 1 capsule by oral route daily   Combigan 0.2-0.5 % ophthalmic (eye) drops  instill 1 drop into affected eye(s) by ophthalmic route every 12 hours   Flonase Allergy Relief 50 mcg/actuation nasal spray,suspension 02/20/2020 spray 1 - 2 sprays (50 - 100 mcg) in each nostril by intranasal route once BID   Fosamax 70 mg oral tablet 02/11/2021 take 1 tablet by oral route once a week for 90 days   Lumigan 0.01 % ophthalmic (eye) drops  instill 1 drop in affected eye daily   Zocor 10 mg oral tablet 02/11/2021 take 1 tablet (10 mg) by oral route once daily in the evening for 90 days   Zyrtec 10 mg oral tablet 04/30/2018 take 1 tablet (10 mg) by oral route once daily         Allergy List  Allergen Name Date Reaction Notes   Codeine Phosphate --  --  --    Codeine Sulfate --  --  --    Macrobid 6/1/14 --  trouble breathing per pt         Family Medical History  Disease Name Relative/Age Notes   Breast Neoplasm, Malignant Sister/   --    Heart Disease Mother/   Mother   Cancer,  Unspecified Mother/   Mother  Mother; Sister   Diabetes, unspecified type Father/   Father   Hypertension Father/   --    Colon Cancer Mother/   --          Reproductive History  Menstrual   Age Menarche: 11   Pregnancy Summary   Total Pregnancies: 2 Full Term: 2 Premature: 0   Ab Induced: 0 Ab Spontaneous: 0 Ectopics: 0   Multiples: 0 Livin         Social History  Finding Status Start/Stop Quantity Notes   Active but no formal exercise --  --/-- --  --    Alcohol Never --/-- --  --    Alcohol Use Current some day --/-- --  rarely drinks   Claustophobic Unknown --/-- --  yes   lives with spouse --  --/-- --  --     --  --/-- --  --    . --  --/-- --  --    No known infection risk --  --/-- --  --    Recreational Drug Use Never --/-- --  no   Retired. --  --/-- --  --    Tobacco Never --/-- --  never smoker   Working --  --/-- --  --          Immunizations  NameDate Admin Mfg Trade Name Lot Number Route Inj VIS Given VIS Publication   Hnqmjikal72/01/2019 NE Not Entered  NE NE     Comments:    Csuxcpmotlhe67/2007 MSD PNEUMOVAX 23  NE NE 2015 10/06/2009   Comments:    Tdap2016 SKB BOOSTRIX AH4LF IM RA 2016   Comments:          Review of Systems  · Constitutional  o Denies  o : fever, fatigue, weight loss, weight gain  · Cardiovascular  o Denies  o : pedal edema, claudication, chest pressure, palpitations  · Respiratory  o Denies  o : shortness of breath, wheezing, cough, hemoptysis, dyspnea on exertion  · Gastrointestinal  o Denies  o : nausea, vomiting, diarrhea, constipation, abdominal pain  · Psychiatric  o Admits  o : anxiety      Physical Examination  · Constitutional  o Appearance  o : well-nourished, well developed, alert, in no acute distress  · Neurologic  o Mental Status Examination  o :   § Orientation  § : grossly oriented to person, place and time  · Psychiatric  o Mood and Affect  o : mood normal, affect appropriate, denies any  SI/HI          Assessment  · Allergic rhinitis due to allergen     477.9/J30.9  · Essential hypertension     401.9/I10  · Breast Neoplasm, Malignant     174.9  · Anxiety     300.02/F41.1  · Hyperlipemia     272.4/E78.5  · Osteopenia     733.90/M85.80  · Thyroid disorder screen     V77.0/Z13.29  · Routine lab draw     V72.60/Z01.89      Plan  · Orders  o CBC with Auto Diff [facility] (23929) - - 02/11/2021  o CMP [facility] (88403) - - 02/11/2021  o Lipid Panel [facility] (56924) - - 02/11/2021  o Thyroid Profile (99762, 94416, THYII) - - 02/11/2021  o JEFFREY Report (KASPR) - - 02/11/2021  o ACO-13: Fall Risk Screening with no falls in past year or only one fall without injury in the past year (1101F) - - 02/11/2021  o ACO-39: Current medications updated and reviewed (1159F, ) - - 02/11/2021  o PHYSICAL THERAPY CONSULTATION ([facility]) - - 02/11/2021   Kemi Smith at [facility] concerning mastectomy form and bra for L side  · Medications  o Ativan 1 mg oral tablet   SIG: take 1/2-1 tablet (1 mg) by oral route 3 times per day as needed   DISP: (60) Tablet with 0 refills  Refilled on 02/11/2021     o Fosamax 70 mg oral tablet   SIG: take 1 tablet by oral route once a week for 90 days   DISP: (13) Tablet with 3 refills  Refilled on 02/11/2021     o Zocor 10 mg oral tablet   SIG: take 1 tablet (10 mg) by oral route once daily in the evening for 90 days   DISP: (90) Tablet with 1 refills  Refilled on 02/11/2021     o Zestoretic 10-12.5 mg oral tablet   SIG: take 1 tablet by oral route once daily for 90 days   DISP: (90) Tablet with 1 refills  Discontinued on 02/11/2021     o Medications have been Reconciled  o Transition of Care or Provider Policy  · Instructions  o Patient advised to monitor blood pressure (B/P) at home and journal readings. Patient informed that a B/P reading at home of more than 130/80 is considered hypertension. For readings greater osau895/90 or higher patient is advised to follow up in the office with readings  for management. Patient advised to limit sodium intake.  o Obtained a written consent for JEFFREY query. Discussed the risk and benefits of the use of controlled substances with the patient, including the risk of tolerance and drug dependence. The patient has been counseled on the need to have an exit strategy, including potentially discontinuing the use of controlled substances. JEFFREY has or will be reviewed as soon as it becomes avaliable.  o Patient was educated/instructed on their diagnosis, treatment and medications prior to discharge from the clinic today.  o Patient instructed to seek medical attention urgently for new or worsening symptoms.  o Call the office with any concerns or questions.  · Disposition  o Return Visit Request in/on 6 months +/- 2 weeks (49924).            Electronically Signed by: SRIRAM Laboy -Author on February 12, 2021 08:39:02 AM

## 2021-05-15 VITALS
DIASTOLIC BLOOD PRESSURE: 74 MMHG | HEIGHT: 65 IN | HEART RATE: 59 BPM | OXYGEN SATURATION: 98 % | WEIGHT: 152.25 LBS | SYSTOLIC BLOOD PRESSURE: 145 MMHG | TEMPERATURE: 97.6 F | BODY MASS INDEX: 25.37 KG/M2

## 2021-05-15 VITALS
HEART RATE: 64 BPM | OXYGEN SATURATION: 96 % | DIASTOLIC BLOOD PRESSURE: 76 MMHG | HEIGHT: 65 IN | BODY MASS INDEX: 25.18 KG/M2 | WEIGHT: 151.12 LBS | SYSTOLIC BLOOD PRESSURE: 144 MMHG

## 2021-05-15 VITALS
HEIGHT: 65 IN | BODY MASS INDEX: 25.74 KG/M2 | HEART RATE: 67 BPM | DIASTOLIC BLOOD PRESSURE: 82 MMHG | SYSTOLIC BLOOD PRESSURE: 152 MMHG | OXYGEN SATURATION: 97 % | WEIGHT: 154.5 LBS

## 2021-05-16 VITALS
WEIGHT: 151.5 LBS | BODY MASS INDEX: 25.24 KG/M2 | SYSTOLIC BLOOD PRESSURE: 154 MMHG | HEART RATE: 62 BPM | DIASTOLIC BLOOD PRESSURE: 68 MMHG | HEIGHT: 65 IN | OXYGEN SATURATION: 96 %

## 2021-05-16 VITALS
HEART RATE: 65 BPM | WEIGHT: 149.5 LBS | HEIGHT: 65 IN | SYSTOLIC BLOOD PRESSURE: 132 MMHG | BODY MASS INDEX: 24.91 KG/M2 | TEMPERATURE: 97.1 F | DIASTOLIC BLOOD PRESSURE: 73 MMHG

## 2021-05-16 VITALS
DIASTOLIC BLOOD PRESSURE: 75 MMHG | BODY MASS INDEX: 25.18 KG/M2 | WEIGHT: 151.12 LBS | HEART RATE: 67 BPM | SYSTOLIC BLOOD PRESSURE: 149 MMHG | HEIGHT: 65 IN

## 2021-08-12 PROBLEM — C50.919 MALIGNANT NEOPLASM OF BREAST (FEMALE) (HCC): Status: ACTIVE | Noted: 2021-08-12

## 2021-08-12 PROBLEM — M79.89 LIMB SWELLING: Status: ACTIVE | Noted: 2021-08-12

## 2021-08-12 PROBLEM — F41.9 ANXIETY: Status: ACTIVE | Noted: 2021-08-12

## 2021-08-12 PROBLEM — N39.0 URINARY TRACT INFECTION: Status: ACTIVE | Noted: 2021-08-12

## 2021-08-12 PROBLEM — C80.1 CANCER: Status: ACTIVE | Noted: 2021-08-12

## 2021-08-12 PROBLEM — R31.29 MICROSCOPIC HEMATURIA: Status: ACTIVE | Noted: 2021-08-12

## 2021-08-12 RX ORDER — SIMVASTATIN 10 MG
10 TABLET ORAL EVERY EVENING
COMMUNITY
Start: 2021-05-11 | End: 2021-08-16 | Stop reason: SDUPTHER

## 2021-08-12 RX ORDER — MULTIPLE VITAMINS W/ MINERALS TAB 9MG-400MCG
TAB ORAL
COMMUNITY
End: 2022-02-16

## 2021-08-12 RX ORDER — BRIMONIDINE TARTRATE/TIMOLOL 0.2%-0.5%
1 DROPS OPHTHALMIC (EYE)
COMMUNITY

## 2021-08-12 RX ORDER — ALENDRONATE SODIUM 70 MG/1
70 TABLET ORAL WEEKLY
COMMUNITY
Start: 2021-05-12 | End: 2022-02-16 | Stop reason: SDUPTHER

## 2021-08-12 RX ORDER — ASPIRIN 81 MG/1
TABLET ORAL
COMMUNITY

## 2021-08-12 RX ORDER — BIMATOPROST 0.01 %
1 DROPS OPHTHALMIC (EYE)
COMMUNITY

## 2021-08-12 RX ORDER — ALBUTEROL SULFATE 90 UG/1
AEROSOL, METERED RESPIRATORY (INHALATION)
COMMUNITY
End: 2021-08-16 | Stop reason: SDUPTHER

## 2021-08-16 ENCOUNTER — TELEMEDICINE (OUTPATIENT)
Dept: FAMILY MEDICINE CLINIC | Facility: CLINIC | Age: 66
End: 2021-08-16

## 2021-08-16 ENCOUNTER — TELEPHONE (OUTPATIENT)
Dept: FAMILY MEDICINE CLINIC | Facility: CLINIC | Age: 66
End: 2021-08-16

## 2021-08-16 DIAGNOSIS — F41.9 ANXIETY: ICD-10-CM

## 2021-08-16 DIAGNOSIS — Z13.29 SCREENING FOR THYROID DISORDER: ICD-10-CM

## 2021-08-16 DIAGNOSIS — Z00.00 MEDICARE ANNUAL WELLNESS VISIT, SUBSEQUENT: Primary | ICD-10-CM

## 2021-08-16 DIAGNOSIS — M81.0 OSTEOPOROSIS, UNSPECIFIED OSTEOPOROSIS TYPE, UNSPECIFIED PATHOLOGICAL FRACTURE PRESENCE: ICD-10-CM

## 2021-08-16 DIAGNOSIS — E78.2 MIXED HYPERLIPIDEMIA: Primary | ICD-10-CM

## 2021-08-16 DIAGNOSIS — J45.909 ASTHMA, UNSPECIFIED ASTHMA SEVERITY, UNSPECIFIED WHETHER COMPLICATED, UNSPECIFIED WHETHER PERSISTENT: ICD-10-CM

## 2021-08-16 DIAGNOSIS — R53.83 FATIGUE, UNSPECIFIED TYPE: ICD-10-CM

## 2021-08-16 DIAGNOSIS — M25.551 RIGHT HIP PAIN: ICD-10-CM

## 2021-08-16 DIAGNOSIS — Z01.89 ROUTINE LAB DRAW: ICD-10-CM

## 2021-08-16 PROBLEM — R31.29 MICROSCOPIC HEMATURIA: Status: RESOLVED | Noted: 2021-08-12 | Resolved: 2021-08-16

## 2021-08-16 PROBLEM — N39.0 URINARY TRACT INFECTION: Status: RESOLVED | Noted: 2021-08-12 | Resolved: 2021-08-16

## 2021-08-16 PROBLEM — M79.89 LIMB SWELLING: Status: RESOLVED | Noted: 2021-08-12 | Resolved: 2021-08-16

## 2021-08-16 PROCEDURE — 99214 OFFICE O/P EST MOD 30 MIN: CPT | Performed by: NURSE PRACTITIONER

## 2021-08-16 PROCEDURE — G0439 PPPS, SUBSEQ VISIT: HCPCS | Performed by: NURSE PRACTITIONER

## 2021-08-16 RX ORDER — LORAZEPAM 1 MG/1
1 TABLET ORAL EVERY 8 HOURS PRN
Qty: 30 TABLET | Refills: 0 | Status: SHIPPED | OUTPATIENT
Start: 2021-08-16 | End: 2021-11-03 | Stop reason: SDUPTHER

## 2021-08-16 RX ORDER — ALBUTEROL SULFATE 90 UG/1
2 AEROSOL, METERED RESPIRATORY (INHALATION) EVERY 6 HOURS PRN
Qty: 18 G | Refills: 1 | Status: SHIPPED | OUTPATIENT
Start: 2021-08-16 | End: 2023-01-05 | Stop reason: SDUPTHER

## 2021-08-16 RX ORDER — SIMVASTATIN 10 MG
10 TABLET ORAL EVERY EVENING
Qty: 90 TABLET | Refills: 1 | Status: SHIPPED | OUTPATIENT
Start: 2021-08-16 | End: 2022-02-16 | Stop reason: SDUPTHER

## 2021-08-16 RX ORDER — LORAZEPAM 1 MG/1
1 TABLET ORAL
COMMUNITY
End: 2021-08-16 | Stop reason: SDUPTHER

## 2021-08-16 RX ORDER — UBIDECARENONE 100 MG
100 CAPSULE ORAL DAILY
COMMUNITY

## 2021-08-16 NOTE — PROGRESS NOTES
The ABCs of the Annual Wellness Visit  Subsequent Medicare Wellness Visit    Chief Complaint   Patient presents with   • Medicare Wellness-subsequent       Subjective   History of Present Illness:  Charla Holman is a 66 y.o. female who presents for a Subsequent Medicare Wellness Visit.    HEALTH RISK ASSESSMENT    Recent Hospitalizations:  No hospitalization(s) within the last year.    Current Medical Providers:  Patient Care Team:  Marylou Hodgson APRN as PCP - General (Nurse Practitioner)    Smoking Status:  Social History     Tobacco Use   Smoking Status Never Smoker   Smokeless Tobacco Never Used       Alcohol Consumption:  Social History     Substance and Sexual Activity   Alcohol Use Yes    Comment: RARELY       Depression Screen:   PHQ-2/PHQ-9 Depression Screening 8/16/2021   Little interest or pleasure in doing things 0   Feeling down, depressed, or hopeless 0   Trouble falling or staying asleep, or sleeping too much 0   Feeling tired or having little energy 0   Poor appetite or overeating 0   Feeling bad about yourself - or that you are a failure or have let yourself or your family down 0   Trouble concentrating on things, such as reading the newspaper or watching television 0   Moving or speaking so slowly that other people could have noticed. Or the opposite - being so fidgety or restless that you have been moving around a lot more than usual 0   Thoughts that you would be better off dead, or of hurting yourself in some way 0   Total Score 0       Fall Risk Screen:  STEADI Fall Risk Assessment was completed, and patient is at MODERATE risk for falls. Assessment completed on:8/16/2021    Health Habits and Functional and Cognitive Screening:  Functional & Cognitive Status 8/16/2021   Do you have difficulty preparing food and eating? No   Do you have difficulty bathing yourself, getting dressed or grooming yourself? No   Do you have difficulty using the toilet? No   Do you have difficulty moving around  from place to place? No   Do you have trouble with steps or getting out of a bed or a chair? No   Current Diet Well Balanced Diet   Dental Exam Up to date   Eye Exam Up to date   Exercise (times per week) 2 times per week   Current Exercises Include Walking   Do you need help using the phone?  No   Are you deaf or do you have serious difficulty hearing?  No   Do you need help with transportation? No   Do you need help shopping? No   Do you need help preparing meals?  No   Do you need help with housework?  No   Do you need help with laundry? No   Do you need help taking your medications? No   Do you need help managing money? No   Do you ever drive or ride in a car without wearing a seat belt? No   Have you felt unusual stress, anger or loneliness in the last month? No   Who do you live with? Spouse   If you need help, do you have trouble finding someone available to you? No   Have you been bothered in the last four weeks by sexual problems? No   Do you have difficulty concentrating, remembering or making decisions? No         Does the patient have evidence of cognitive impairment? No    Asprin use counseling:Taking ASA appropriately as indicated    Age-appropriate Screening Schedule:  Refer to the list below for future screening recommendations based on patient's age, sex and/or medical conditions. Orders for these recommended tests are listed in the plan section. The patient has been provided with a written plan.    Health Maintenance   Topic Date Due   • ZOSTER VACCINE (2 of 3) 08/13/2015   • INFLUENZA VACCINE  10/01/2021   • DXA SCAN  11/11/2021   • LIPID PANEL  03/09/2022   • MAMMOGRAM  05/10/2022   • TDAP/TD VACCINES (2 - Td or Tdap) 05/17/2026          The following portions of the patient's history were reviewed and updated as appropriate: allergies, current medications, past family history, past medical history, past social history, past surgical history and problem list.    Outpatient Medications Prior to  Visit   Medication Sig Dispense Refill   • albuterol sulfate HFA (Ventolin HFA) 108 (90 Base) MCG/ACT inhaler Ventolin HFA 90 mcg/actuation inhalation HFA aerosol inhaler inhale 2 puffs by inhalation route every 2 hours as needed   Suspended     • alendronate (FOSAMAX) 70 MG tablet Take 70 mg by mouth 1 (One) Time Per Week.     • aspirin (aspirin) 81 MG EC tablet aspirin 81 mg oral tablet,delayed release (DR/EC) take 1 tablet (81 mg) by oral route once daily   Active     • bimatoprost (Lumigan) 0.01 % ophthalmic drops 1 drop.     • brimonidine-timolol (Combigan) 0.2-0.5 % ophthalmic solution 1 drop.     • Calcium Carbonate 1500 (600 Ca) MG tablet Calcium 600 600 mg (1,500 mg) oral tablet take 1 tablet by oral route daily   Active     • Calcium-Vitamin D-Vitamin K 500-100-40 MG-UNT-MCG chewable tablet Chew.     • coenzyme Q10 100 MG capsule Take 100 mg by mouth Daily.     • LORazepam (ATIVAN) 1 MG tablet Take 1 mg by mouth.     • simvastatin (ZOCOR) 10 MG tablet Take 10 mg by mouth Every Evening.     • LOW-DOSE ASPIRIN PO Take 81 mg by mouth Daily.     • multivitamin with minerals (I-andrea) tablet tablet Areds Vitamin Take one by mouth BID   Active       No facility-administered medications prior to visit.       Patient Active Problem List   Diagnosis   • Acute cystitis   • Anxiety   • Asthma   • Cancer (CMS/HCC)   • Mixed hyperlipidemia   • Malignant neoplasm of breast (female) (CMS/HCC)       Advanced Care Planning:  ACP discussion was held with the patient during this visit. Patient has an advance directive (not in EMR), copy requested.        Compared to one year ago, the patient feels her physical health is the same.  Compared to one year ago, the patient feels her mental health is the same.    Reviewed chart for potential of high risk medication in the elderly: yes  Reviewed chart for potential of harmful drug interactions in the elderly:yes    Objective       There were no vitals filed for this visit.    There  is no height or weight on file to calculate BMI.  Discussed the patient's BMI with her. The BMI is in the acceptable range.              Assessment/Plan   Medicare Risks and Personalized Health Plan  CMS Preventative Services Quick Reference  Advance Directive Discussion  Dementia/Memory   Diabetic Lab Screening   Fall Risk    The above risks/problems have been discussed with the patient.  Pertinent information has been shared with the patient in the After Visit Summary.  Follow up plans and orders are seen below in the Assessment/Plan Section.    Diagnoses and all orders for this visit:    1. Medicare annual wellness visit, subsequent (Primary)      Follow Up:  Return in about 1 year (around 8/16/2022) for Medicare Wellness.     An After Visit Summary and PPPS were given to the patient.

## 2021-08-16 NOTE — TELEPHONE ENCOUNTER
Caller: Charla Holman    Relationship: Self    Best call back number: [phone]     What is the best time to reach you: ANY    Who are you requesting to speak with (clinical staff, provider,  specific staff member): CLINICAL      What was the call regarding: PATIENT WOULD LIKE TO KNOW IF ALEVE OR ADVIL WAS THE MEDICATION Marylou Hodgson APRN WAS RECOMMENDING FOR THE ANTI- INFLAMMATORY; AND SHE WOULD LIKE TO KNOW WHAT MILLIGRAMS PER DAY SHE SHOULD TAKE.    Do you require a callback: YES

## 2021-08-16 NOTE — TELEPHONE ENCOUNTER
She can try 600 to 800 mg Ibuprofen q 8 hrs prn for a short period of time if it persists pt to f/u

## 2021-08-16 NOTE — PROGRESS NOTES
Chief Complaint  Anxiety, Depression, Hip Pain (right), and Hyperlipidemia    Subjective            Charla Elin Holman presents to [facility]  Patient is doing her 6 month follow up on her Hyperlipidemia, anxiety, and depression and asthma. She needs refills on her Ativan, Albuterol inhaler, and simvastatin. She is having right hip pain that she feels she may have bursitis for the last three weeks. She has been doing beginner exercises that she may have overdone. It will wake her up in the night and she cannot sleep on it. She took some aleve which seems to have helped if it persists she will f/u.    Colonoscopy: scheduled on 08/23/2021  Labs: 3/9/2021  Mammogram: 5/10/2021  DEXA:11/11/2019  Covid: X2 moderna in chart          PMH  Past Medical History:   Diagnosis Date   • Acute cystitis 09/19/2014   • Anxiety    • Asthma 02/21/2014   • Breast cancer (CMS/HCC)    • Cancer (CMS/HCC)    • High cholesterol 02/21/2014   • Hyperlipemia    • Limb swelling    • Microscopic hematuria    • UTI (urinary tract infection)        ALLERGY  Allergies   Allergen Reactions   • Codeine Unknown - Low Severity   • Macrobid [Nitrofurantoin] Unknown - Low Severity        SURGICALHX  Past Surgical History:   Procedure Laterality Date   • APPENDECTOMY  2006   • CHOLECYSTECTOMY  2000   • COLONOSCOPY  01/11/2016    DR. RIVERA GODINEZ   • EYE SURGERY      EYE IMPLANT   • GALLBLADDER SURGERY     • HYSTERECTOMY      PARTIAL   • MASTECTOMY Left         SOCX  Social History     Tobacco Use   • Smoking status: Never Smoker   • Smokeless tobacco: Never Used   Substance Use Topics   • Alcohol use: Yes     Comment: RARELY       FAMHX  Family History   Problem Relation Age of Onset   • Heart disease Mother    • Cancer Mother    • Colon cancer Mother    • Diabetes Father    • Hypertension Father    • Breast cancer Sister    • Cancer Sister         MEDSIGONLY  Current Outpatient Medications on File Prior to Visit    Medication Sig   • alendronate (FOSAMAX) 70 MG tablet Take 70 mg by mouth 1 (One) Time Per Week.   • aspirin (aspirin) 81 MG EC tablet aspirin 81 mg oral tablet,delayed release (DR/EC) take 1 tablet (81 mg) by oral route once daily   Active   • bimatoprost (Lumigan) 0.01 % ophthalmic drops 1 drop.   • brimonidine-timolol (Combigan) 0.2-0.5 % ophthalmic solution 1 drop.   • Calcium Carbonate 1500 (600 Ca) MG tablet Calcium 600 600 mg (1,500 mg) oral tablet take 1 tablet by oral route daily   Active   • Calcium-Vitamin D-Vitamin K 500-100-40 MG-UNT-MCG chewable tablet Chew.   • coenzyme Q10 100 MG capsule Take 100 mg by mouth Daily.   • [DISCONTINUED] albuterol sulfate HFA (Ventolin HFA) 108 (90 Base) MCG/ACT inhaler Ventolin HFA 90 mcg/actuation inhalation HFA aerosol inhaler inhale 2 puffs by inhalation route every 2 hours as needed   Suspended   • [DISCONTINUED] LORazepam (ATIVAN) 1 MG tablet Take 1 mg by mouth.   • [DISCONTINUED] simvastatin (ZOCOR) 10 MG tablet Take 10 mg by mouth Every Evening.   • LOW-DOSE ASPIRIN PO Take 81 mg by mouth Daily.   • multivitamin with minerals (I-andrea) tablet tablet Areds Vitamin Take one by mouth BID   Active     No current facility-administered medications on file prior to visit.       Health Maintenance Due   Topic Date Due   • ZOSTER VACCINE (2 of 3) 08/13/2015   • COVID-19 Vaccine (2 - Moderna 2-dose series) 03/31/2021       Objective     There were no vitals taken for this visit.      Physical Exam  Constitutional:       Appearance: Normal appearance. She is normal weight.   Neurological:      Mental Status: She is alert.   Psychiatric:         Attention and Perception: Attention and perception normal.         Mood and Affect: Mood and affect normal.         Behavior: Behavior normal. Behavior is cooperative.         Thought Content: Thought content normal.         Cognition and Memory: Cognition and memory normal.         Judgment: Judgment normal.           Result Review  :                           Assessment and Plan        Diagnoses and all orders for this visit:    1. Mixed hyperlipidemia (Primary)  Comments:  stable on Zocor 10mg  Orders:  -     Lipid panel; Future  -     simvastatin (ZOCOR) 10 MG tablet; Take 1 tablet by mouth Every Evening.  Dispense: 90 tablet; Refill: 1    2. Anxiety  Comments:  stable on Ativan 1mg prn  Orders:  -     LORazepam (ATIVAN) 1 MG tablet; Take 1 tablet by mouth Every 8 (Eight) Hours As Needed for Anxiety.  Dispense: 30 tablet; Refill: 0    3. Screening for thyroid disorder  -     TSH; Future  -     T4, free; Future    4. Routine lab draw  -     CBC w AUTO Differential; Future  -     Comprehensive metabolic panel; Future    5. Asthma, unspecified asthma severity, unspecified whether complicated, unspecified whether persistent  Comments:  stable on ALbuteral HFA prn  Orders:  -     albuterol sulfate HFA (Ventolin HFA) 108 (90 Base) MCG/ACT inhaler; Inhale 2 puffs Every 6 (Six) Hours As Needed for Wheezing.  Dispense: 18 g; Refill: 1    6. Osteoporosis, unspecified osteoporosis type, unspecified pathological fracture presence  -     DEXA Bone Density Axial    7. Fatigue, unspecified type  -     CBC w AUTO Differential; Future    8. Right hip pain  Comments:  antiinflammatory prn, rest area if persisiting pt to f/u for further evaluation              Follow Up     No follow-ups on file.    Patient was given instructions and counseling regarding her condition or for health maintenance advice. Please see specific information pulled into the AVS if appropriate.         Marylou Hodgson, SRIRAM

## 2021-08-18 ENCOUNTER — LAB (OUTPATIENT)
Dept: LAB | Facility: HOSPITAL | Age: 66
End: 2021-08-18

## 2021-08-18 ENCOUNTER — CLINICAL SUPPORT (OUTPATIENT)
Dept: FAMILY MEDICINE CLINIC | Facility: CLINIC | Age: 66
End: 2021-08-18

## 2021-08-18 DIAGNOSIS — R53.83 FATIGUE, UNSPECIFIED TYPE: ICD-10-CM

## 2021-08-18 DIAGNOSIS — F41.9 ANXIETY: Primary | ICD-10-CM

## 2021-08-18 DIAGNOSIS — Z01.89 ROUTINE LAB DRAW: ICD-10-CM

## 2021-08-18 DIAGNOSIS — Z13.29 SCREENING FOR THYROID DISORDER: ICD-10-CM

## 2021-08-18 DIAGNOSIS — E78.2 MIXED HYPERLIPIDEMIA: ICD-10-CM

## 2021-08-18 DIAGNOSIS — Z79.899 LONG TERM USE OF DRUG: ICD-10-CM

## 2021-08-18 LAB
ALBUMIN SERPL-MCNC: 4.3 G/DL (ref 3.5–5.2)
ALBUMIN/GLOB SERPL: 1.7 G/DL
ALP SERPL-CCNC: 50 U/L (ref 39–117)
ALT SERPL W P-5'-P-CCNC: 19 U/L (ref 1–33)
AMPHET+METHAMPHET UR QL: NEGATIVE
AMPHETAMINE INTERNAL CONTROL: NORMAL
AMPHETAMINES UR QL: NEGATIVE
ANION GAP SERPL CALCULATED.3IONS-SCNC: 8.1 MMOL/L (ref 5–15)
AST SERPL-CCNC: 21 U/L (ref 1–32)
BARBITURATE INTERNAL CONTROL: NORMAL
BARBITURATES UR QL SCN: NEGATIVE
BASOPHILS # BLD AUTO: 0.03 10*3/MM3 (ref 0–0.2)
BASOPHILS NFR BLD AUTO: 0.6 % (ref 0–1.5)
BENZODIAZ UR QL SCN: NEGATIVE
BENZODIAZEPINE INTERNAL CONTROL: NORMAL
BILIRUB SERPL-MCNC: 0.6 MG/DL (ref 0–1.2)
BUN SERPL-MCNC: 17 MG/DL (ref 8–23)
BUN/CREAT SERPL: 18.9 (ref 7–25)
BUPRENORPHINE INTERNAL CONTROL: NORMAL
BUPRENORPHINE SERPL-MCNC: NEGATIVE NG/ML
CALCIUM SPEC-SCNC: 9.8 MG/DL (ref 8.6–10.5)
CANNABINOIDS SERPL QL: NEGATIVE
CHLORIDE SERPL-SCNC: 105 MMOL/L (ref 98–107)
CHOLEST SERPL-MCNC: 178 MG/DL (ref 0–200)
CO2 SERPL-SCNC: 27.9 MMOL/L (ref 22–29)
COCAINE INTERNAL CONTROL: NORMAL
COCAINE UR QL: NEGATIVE
CREAT SERPL-MCNC: 0.9 MG/DL (ref 0.57–1)
DEPRECATED RDW RBC AUTO: 47.6 FL (ref 37–54)
EOSINOPHIL # BLD AUTO: 0.32 10*3/MM3 (ref 0–0.4)
EOSINOPHIL NFR BLD AUTO: 6.3 % (ref 0.3–6.2)
ERYTHROCYTE [DISTWIDTH] IN BLOOD BY AUTOMATED COUNT: 13.8 % (ref 12.3–15.4)
EXPIRATION DATE: NORMAL
GFR SERPL CREATININE-BSD FRML MDRD: 62 ML/MIN/1.73
GLOBULIN UR ELPH-MCNC: 2.6 GM/DL
GLUCOSE SERPL-MCNC: 83 MG/DL (ref 65–99)
HCT VFR BLD AUTO: 38.4 % (ref 34–46.6)
HDLC SERPL-MCNC: 62 MG/DL (ref 40–60)
HGB BLD-MCNC: 12.8 G/DL (ref 12–15.9)
IMM GRANULOCYTES # BLD AUTO: 0.01 10*3/MM3 (ref 0–0.05)
IMM GRANULOCYTES NFR BLD AUTO: 0.2 % (ref 0–0.5)
LDLC SERPL CALC-MCNC: 102 MG/DL (ref 0–100)
LDLC/HDLC SERPL: 1.63 {RATIO}
LYMPHOCYTES # BLD AUTO: 1.41 10*3/MM3 (ref 0.7–3.1)
LYMPHOCYTES NFR BLD AUTO: 27.5 % (ref 19.6–45.3)
Lab: NORMAL
MCH RBC QN AUTO: 31.6 PG (ref 26.6–33)
MCHC RBC AUTO-ENTMCNC: 33.3 G/DL (ref 31.5–35.7)
MCV RBC AUTO: 94.8 FL (ref 79–97)
MDMA (ECSTASY) INTERNAL CONTROL: NORMAL
MDMA UR QL SCN: NEGATIVE
METHADONE INTERNAL CONTROL: NORMAL
METHADONE UR QL SCN: NEGATIVE
METHAMPHETAMINE INTERNAL CONTROL: NORMAL
MONOCYTES # BLD AUTO: 0.51 10*3/MM3 (ref 0.1–0.9)
MONOCYTES NFR BLD AUTO: 10 % (ref 5–12)
NEUTROPHILS NFR BLD AUTO: 2.84 10*3/MM3 (ref 1.7–7)
NEUTROPHILS NFR BLD AUTO: 55.4 % (ref 42.7–76)
NRBC BLD AUTO-RTO: 0 /100 WBC (ref 0–0.2)
OPIATES INTERNAL CONTROL: NORMAL
OPIATES UR QL: NEGATIVE
OXYCODONE INTERNAL CONTROL: NORMAL
OXYCODONE UR QL SCN: NEGATIVE
PCP UR QL SCN: NEGATIVE
PHENCYCLIDINE INTERNAL CONTROL: NORMAL
PLATELET # BLD AUTO: 231 10*3/MM3 (ref 140–450)
PMV BLD AUTO: 11.4 FL (ref 6–12)
POTASSIUM SERPL-SCNC: 4.6 MMOL/L (ref 3.5–5.2)
PROT SERPL-MCNC: 6.9 G/DL (ref 6–8.5)
RBC # BLD AUTO: 4.05 10*6/MM3 (ref 3.77–5.28)
SODIUM SERPL-SCNC: 141 MMOL/L (ref 136–145)
T4 FREE SERPL-MCNC: 1.22 NG/DL (ref 0.93–1.7)
THC INTERNAL CONTROL: NORMAL
TRIGL SERPL-MCNC: 75 MG/DL (ref 0–150)
TSH SERPL DL<=0.05 MIU/L-ACNC: 3.28 UIU/ML (ref 0.27–4.2)
VLDLC SERPL-MCNC: 14 MG/DL (ref 5–40)
WBC # BLD AUTO: 5.12 10*3/MM3 (ref 3.4–10.8)

## 2021-08-18 PROCEDURE — 84439 ASSAY OF FREE THYROXINE: CPT

## 2021-08-18 PROCEDURE — 36415 COLL VENOUS BLD VENIPUNCTURE: CPT

## 2021-08-18 PROCEDURE — 85025 COMPLETE CBC W/AUTO DIFF WBC: CPT

## 2021-08-18 PROCEDURE — 80053 COMPREHEN METABOLIC PANEL: CPT

## 2021-08-18 PROCEDURE — 84443 ASSAY THYROID STIM HORMONE: CPT

## 2021-08-18 PROCEDURE — 80061 LIPID PANEL: CPT

## 2021-08-18 PROCEDURE — 80305 DRUG TEST PRSMV DIR OPT OBS: CPT | Performed by: NURSE PRACTITIONER

## 2021-08-19 ENCOUNTER — TELEPHONE (OUTPATIENT)
Dept: FAMILY MEDICINE CLINIC | Facility: CLINIC | Age: 66
End: 2021-08-19

## 2021-08-23 ENCOUNTER — HOSPITAL ENCOUNTER (OUTPATIENT)
Facility: HOSPITAL | Age: 66
Setting detail: HOSPITAL OUTPATIENT SURGERY
Discharge: HOME | End: 2021-08-23
Attending: INTERNAL MEDICINE | Admitting: INTERNAL MEDICINE

## 2021-08-23 ENCOUNTER — ANESTHESIA (OUTPATIENT)
Dept: GASTROENTEROLOGY | Facility: HOSPITAL | Age: 66
End: 2021-08-23

## 2021-08-23 ENCOUNTER — ANESTHESIA EVENT (OUTPATIENT)
Dept: GASTROENTEROLOGY | Facility: HOSPITAL | Age: 66
End: 2021-08-23

## 2021-08-23 VITALS
TEMPERATURE: 97.3 F | HEIGHT: 65 IN | WEIGHT: 151.68 LBS | RESPIRATION RATE: 15 BRPM | HEART RATE: 63 BPM | SYSTOLIC BLOOD PRESSURE: 159 MMHG | OXYGEN SATURATION: 100 % | DIASTOLIC BLOOD PRESSURE: 86 MMHG | BODY MASS INDEX: 25.27 KG/M2

## 2021-08-23 DIAGNOSIS — Z86.010 HISTORY OF COLON POLYPS: ICD-10-CM

## 2021-08-23 PROCEDURE — 88305 TISSUE EXAM BY PATHOLOGIST: CPT | Performed by: INTERNAL MEDICINE

## 2021-08-23 PROCEDURE — 25010000002 PROPOFOL 10 MG/ML EMULSION: Performed by: NURSE ANESTHETIST, CERTIFIED REGISTERED

## 2021-08-23 PROCEDURE — 45385 COLONOSCOPY W/LESION REMOVAL: CPT | Performed by: INTERNAL MEDICINE

## 2021-08-23 RX ORDER — PROPOFOL 10 MG/ML
VIAL (ML) INTRAVENOUS AS NEEDED
Status: DISCONTINUED | OUTPATIENT
Start: 2021-08-23 | End: 2021-08-23 | Stop reason: SURG

## 2021-08-23 RX ORDER — SODIUM CHLORIDE, SODIUM LACTATE, POTASSIUM CHLORIDE, CALCIUM CHLORIDE 600; 310; 30; 20 MG/100ML; MG/100ML; MG/100ML; MG/100ML
30 INJECTION, SOLUTION INTRAVENOUS CONTINUOUS
Status: DISCONTINUED | OUTPATIENT
Start: 2021-08-23 | End: 2021-08-23 | Stop reason: HOSPADM

## 2021-08-23 RX ADMIN — PROPOFOL 100 MG: 10 INJECTION, EMULSION INTRAVENOUS at 08:19

## 2021-08-23 RX ADMIN — SODIUM CHLORIDE, POTASSIUM CHLORIDE, SODIUM LACTATE AND CALCIUM CHLORIDE 30 ML/HR: 600; 310; 30; 20 INJECTION, SOLUTION INTRAVENOUS at 08:05

## 2021-08-23 RX ADMIN — PROPOFOL 200 MCG/KG/MIN: 10 INJECTION, EMULSION INTRAVENOUS at 08:20

## 2021-08-23 NOTE — H&P
ScreeningPre Procedure History & Physical    Chief Complaint:   Screening     Subjective     HPI:   Screening     Past Medical History:   Past Medical History:   Diagnosis Date   • Acute cystitis 09/19/2014   • Anxiety    • Asthma 02/21/2014   • Breast cancer (CMS/HCC)    • Cancer (CMS/HCC)    • Glaucoma    • High cholesterol 02/21/2014   • Hyperlipemia    • Limb swelling    • Microscopic hematuria    • UTI (urinary tract infection)        Past Surgical History:  Past Surgical History:   Procedure Laterality Date   • APPENDECTOMY  2006   • CHOLECYSTECTOMY  2000   • COLONOSCOPY  01/11/2016    DR. RIVERA GODINEZ   • EYE SURGERY      EYE IMPLANT   • GALLBLADDER SURGERY     • HYSTERECTOMY      PARTIAL   • MASTECTOMY Left        Family History:  Family History   Problem Relation Age of Onset   • Heart disease Mother    • Cancer Mother    • Colon cancer Mother    • Diabetes Father    • Hypertension Father    • Breast cancer Sister    • Cancer Sister    • Malig Hyperthermia Neg Hx        Social History:   reports that she has never smoked. She has never used smokeless tobacco. She reports previous alcohol use. She reports that she does not use drugs.    Medications:   Medications Prior to Admission   Medication Sig Dispense Refill Last Dose   • alendronate (FOSAMAX) 70 MG tablet Take 70 mg by mouth 1 (One) Time Per Week.   Past Week at Unknown time   • aspirin (aspirin) 81 MG EC tablet aspirin 81 mg oral tablet,delayed release (DR/EC) take 1 tablet (81 mg) by oral route once daily   Active   Past Week at Unknown time   • bimatoprost (Lumigan) 0.01 % ophthalmic drops 1 drop.   8/22/2021 at Unknown time   • brimonidine-timolol (Combigan) 0.2-0.5 % ophthalmic solution 1 drop.   8/22/2021 at Unknown time   • coenzyme Q10 100 MG capsule Take 100 mg by mouth Daily.   Past Week at Unknown time   • LORazepam (ATIVAN) 1 MG tablet Take 1 tablet by mouth Every 8 (Eight) Hours As Needed for Anxiety. 30 tablet 0 Past Week at Unknown time  "  • Multiple Vitamins-Minerals (PRESERVISION AREDS 2 PO) Take 1 tablet by mouth 2 (two) times a day.   Past Week at Unknown time   • simvastatin (ZOCOR) 10 MG tablet Take 1 tablet by mouth Every Evening. 90 tablet 1 Past Week at Unknown time   • albuterol sulfate HFA (Ventolin HFA) 108 (90 Base) MCG/ACT inhaler Inhale 2 puffs Every 6 (Six) Hours As Needed for Wheezing. 18 g 1 More than a month at Unknown time   • Calcium Carbonate 1500 (600 Ca) MG tablet Calcium 600 600 mg (1,500 mg) oral tablet take 1 tablet by oral route daily   Active      • Calcium-Vitamin D-Vitamin K 500-100-40 MG-UNT-MCG chewable tablet Chew.      • LOW-DOSE ASPIRIN PO Take 81 mg by mouth Daily.      • multivitamin with minerals (I-andrea) tablet tablet Areds Vitamin Take one by mouth BID   Active          Allergies:  Codeine and Macrobid [nitrofurantoin]        Objective     Blood pressure 165/88, pulse 88, temperature 98.6 °F (37 °C), temperature source Temporal, resp. rate 18, height 165.1 cm (65\"), weight 68.8 kg (151 lb 10.8 oz), SpO2 99 %.    Physical Exam   Constitutional: Pt is oriented to person, place, and time and well-developed, well-nourished, and in no distress.   Mouth/Throat: Oropharynx is clear and moist.   Neck: Normal range of motion.   Cardiovascular: Normal rate, regular rhythm and normal heart sounds.    Pulmonary/Chest: Effort normal and breath sounds normal.   Abdominal: Soft. Nontender  Skin: Skin is warm and dry.   Psychiatric: Mood, memory, affect and judgment normal.     Assessment/Plan     Diagnosis:  Screening colonoscopy  H/o colon polyps   Family h/o colon cancer    Anticipated Surgical Procedure:  colonoscopy    The risks, benefits, and alternatives of this procedure have been discussed with the patient or the responsible party- the patient understands and agrees to proceed.            "

## 2021-08-23 NOTE — ANESTHESIA PREPROCEDURE EVALUATION
Anesthesia Evaluation     Patient summary reviewed and Nursing notes reviewed   no history of anesthetic complications:  NPO Solid Status: > 8 hours  NPO Liquid Status: > 2 hours           Airway   Mallampati: II  TM distance: >3 FB  Neck ROM: full  No difficulty expected  Dental - normal exam     Pulmonary - normal exam   (+) asthma (denies any recent exacerbations. Denies any past hospitatlizations for ashtma.),  (-) not a smoker  Cardiovascular - normal exam  Exercise tolerance: good (4-7 METS)    (+) hyperlipidemia,       Neuro/Psych  (+) psychiatric history Anxiety,     GI/Hepatic/Renal/Endo - negative ROS     ROS Comment: Hx of UTI    Musculoskeletal (-) negative ROS    Abdominal  - normal exam   Substance History - negative use     OB/GYN negative ob/gyn ROS         Other      history of cancer (breast)    ROS/Med Hx Other: Limb swelling  Glaucoma  Left arm precautions                  Anesthesia Plan    ASA 2     MAC   (Total IV Anesthesia    Patient understands anesthesia not responsible for dental damage.  )  intravenous induction     Anesthetic plan, all risks, benefits, and alternatives have been provided, discussed and informed consent has been obtained with: patient.

## 2021-08-23 NOTE — ANESTHESIA POSTPROCEDURE EVALUATION
Patient: Charla Holman    Procedure Summary     Date: 08/23/21 Room / Location: [facility] ENDOSCOPY 2 / [facility]COPY    Anesthesia Start: 0817 Anesthesia Stop: 0834    Procedure: COLONOSCOPY WITH POLYPECTOMY (N/A ) Diagnosis: (history of colon polyps)    Surgeons: Ravinder Uriarte MD Provider: Radha Santiago DO    Anesthesia Type: MAC ASA Status: 2          Anesthesia Type: MAC    Vitals  Vitals Value Taken Time   /86 08/23/21 0850   Temp 36.3 °C (97.3 °F) 08/23/21 0850   Pulse 65 08/23/21 0851   Resp 15 08/23/21 0850   SpO2 99 % 08/23/21 0851   Vitals shown include unvalidated device data.        Post Anesthesia Care and Evaluation    Patient location during evaluation: bedside  Patient participation: complete - patient participated  Level of consciousness: awake  Pain management: adequate  Airway patency: patent  Anesthetic complications: No anesthetic complications  PONV Status: none  Cardiovascular status: acceptable and stable  Respiratory status: acceptable  Hydration status: acceptable    Comments: An Anesthesiologist personally participated in the most demanding procedures (including induction and emergence if applicable) in the anesthesia plan, monitored the course of anesthesia administration at frequent intervals and remained physically present and available for immediate diagnosis and treatment of emergencies.

## 2021-08-24 LAB
CYTO UR: NORMAL
LAB AP CASE REPORT: NORMAL
LAB AP CLINICAL INFORMATION: NORMAL
PATH REPORT.FINAL DX SPEC: NORMAL
PATH REPORT.GROSS SPEC: NORMAL

## 2021-09-02 ENCOUNTER — TELEPHONE (OUTPATIENT)
Dept: FAMILY MEDICINE CLINIC | Facility: CLINIC | Age: 66
End: 2021-09-02

## 2021-09-02 NOTE — TELEPHONE ENCOUNTER
Caller: Charla Holman    Relationship: Self    Best call back number: [phone]    What is the best time to reach you: ANYTIME    Who are you requesting to speak with (clinical staff, provider,  specific staff member): CLINICAL    What was the call regarding: THE PATIENT STATED HER MYCHART DOES NOT REFLECT THE RECORD OF HER PREVIOUS COVID VACCINE. SHE RECEIVED HER SECOND DOSE OF HER MODERNA VACCINE ON 03/31/2021. SHE WOULD LIKE THIS UPDATED ASAP.     Do you require a callback: YES

## 2021-11-03 ENCOUNTER — TELEPHONE (OUTPATIENT)
Dept: FAMILY MEDICINE CLINIC | Facility: CLINIC | Age: 66
End: 2021-11-03

## 2021-11-03 DIAGNOSIS — F41.9 ANXIETY: ICD-10-CM

## 2021-11-03 RX ORDER — LORAZEPAM 1 MG/1
1 TABLET ORAL EVERY 8 HOURS PRN
Qty: 30 TABLET | Refills: 0 | Status: SHIPPED | OUTPATIENT
Start: 2021-11-03 | End: 2022-02-16 | Stop reason: SDUPTHER

## 2021-11-03 NOTE — TELEPHONE ENCOUNTER
Caller: Charla Holman    Relationship: Self      Medication requested (name and dosage):     Requested Prescriptions:      LORazepam (ATIVAN) 1 MG tablet  1 mg, Every 8 Hours PRN         Pharmacy where request should be sent: [facility] [address] [place] [address] - [phone] [facility] [phone]   [phone]    Additional details provided by patient: PATIENT SAID SHE NEEDS A REFILL FOR SIXTY INSTEAD OF 30 . SHE SAYS SHE USUALLY GETS A 60 DISPENSE.    Best call back number: [phone]    Does the patient have less than a 3 day supply:  [x] Yes  [] No    Pito Friedman Rep   11/03/21 08:56 EDT

## 2021-11-03 NOTE — TELEPHONE ENCOUNTER
RF request for Ativan 1 mg Q8hrs PRN #60 0 RF to [facility]    LRX 8/16/21    UDS 8/18/21    LOV 8/16/21    OV 2/16/22

## 2021-11-30 ENCOUNTER — HOSPITAL ENCOUNTER (OUTPATIENT)
Dept: BONE DENSITY | Facility: HOSPITAL | Age: 66
Discharge: HOME | End: 2021-11-30
Admitting: NURSE PRACTITIONER

## 2021-11-30 ENCOUNTER — TELEPHONE (OUTPATIENT)
Dept: FAMILY MEDICINE CLINIC | Facility: CLINIC | Age: 66
End: 2021-11-30

## 2021-11-30 PROCEDURE — 77080 DXA BONE DENSITY AXIAL: CPT

## 2021-11-30 NOTE — TELEPHONE ENCOUNTER
----- Message from SRIRAM Laboy sent at 11/30/2021 12:33 PM EST -----  Osteopenia repeat in 2 years

## 2022-02-16 ENCOUNTER — OFFICE VISIT (OUTPATIENT)
Dept: FAMILY MEDICINE CLINIC | Facility: CLINIC | Age: 67
End: 2022-02-16

## 2022-02-16 VITALS
HEART RATE: 78 BPM | OXYGEN SATURATION: 100 % | WEIGHT: 157 LBS | HEIGHT: 65 IN | SYSTOLIC BLOOD PRESSURE: 147 MMHG | DIASTOLIC BLOOD PRESSURE: 69 MMHG | BODY MASS INDEX: 26.16 KG/M2

## 2022-02-16 DIAGNOSIS — E78.2 MIXED HYPERLIPIDEMIA: ICD-10-CM

## 2022-02-16 DIAGNOSIS — M81.0 OSTEOPOROSIS, UNSPECIFIED OSTEOPOROSIS TYPE, UNSPECIFIED PATHOLOGICAL FRACTURE PRESENCE: ICD-10-CM

## 2022-02-16 DIAGNOSIS — F41.9 ANXIETY: ICD-10-CM

## 2022-02-16 DIAGNOSIS — M25.551 RIGHT HIP PAIN: Primary | ICD-10-CM

## 2022-02-16 PROCEDURE — 99214 OFFICE O/P EST MOD 30 MIN: CPT | Performed by: NURSE PRACTITIONER

## 2022-02-16 PROCEDURE — 96372 THER/PROPH/DIAG INJ SC/IM: CPT | Performed by: NURSE PRACTITIONER

## 2022-02-16 RX ORDER — SIMVASTATIN 10 MG
10 TABLET ORAL EVERY EVENING
Qty: 90 TABLET | Refills: 1 | Status: SHIPPED | OUTPATIENT
Start: 2022-02-16 | End: 2022-12-22

## 2022-02-16 RX ORDER — LORAZEPAM 1 MG/1
1 TABLET ORAL EVERY 8 HOURS PRN
Qty: 30 TABLET | Refills: 0 | Status: SHIPPED | OUTPATIENT
Start: 2022-02-16 | End: 2022-05-18 | Stop reason: SDUPTHER

## 2022-02-16 RX ORDER — NETARSUDIL 0.2 MG/ML
SOLUTION/ DROPS OPHTHALMIC; TOPICAL
COMMUNITY
Start: 2021-12-31

## 2022-02-16 RX ORDER — TRIAMCINOLONE ACETONIDE 40 MG/ML
60 INJECTION, SUSPENSION INTRA-ARTICULAR; INTRAMUSCULAR ONCE
Status: COMPLETED | OUTPATIENT
Start: 2022-02-16 | End: 2022-02-16

## 2022-02-16 RX ORDER — ALENDRONATE SODIUM 70 MG/1
70 TABLET ORAL WEEKLY
Qty: 13 TABLET | Refills: 1 | Status: SHIPPED | OUTPATIENT
Start: 2022-02-16 | End: 2022-10-20

## 2022-02-16 RX ADMIN — TRIAMCINOLONE ACETONIDE 60 MG: 40 INJECTION, SUSPENSION INTRA-ARTICULAR; INTRAMUSCULAR at 10:31

## 2022-02-16 NOTE — PROGRESS NOTES
Chief Complaint  Hyperlipidemia and Hip Pain and anxiety, and ostoeporosis    Subjective            Charla Holman presents to [facility]  Pt is here for a 6 mo f/u on Anxiety, osteoporosis and hyperlipidemia.  She is doing well on all her mediations.  Pt has c/o posssible bursitis in (R) hip. Pt has not tried anything at home to help relieve the pain. In the past she has had a steroid shot that really helped and would like one again if possible.    Pt would like refills to go to [facility]    Pt is due labs.         PMH  Past Medical History:   Diagnosis Date   • Acute cystitis 09/19/2014   • Anxiety    • Asthma 02/21/2014   • Breast cancer (HCC)    • Cancer (HCC)    • Glaucoma    • High cholesterol 02/21/2014   • Hyperlipemia    • Limb swelling    • Microscopic hematuria    • UTI (urinary tract infection)        ALLERGY  Allergies   Allergen Reactions   • Codeine Unknown - Low Severity   • Macrobid [Nitrofurantoin] Unknown - Low Severity        SURGICALHX  Past Surgical History:   Procedure Laterality Date   • APPENDECTOMY  2006   • BREAST SURGERY  2007   • CHOLECYSTECTOMY  2000   • COLONOSCOPY  01/11/2016    DR. RIVERA GODINEZ   • COLONOSCOPY N/A 8/23/2021    Procedure: COLONOSCOPY WITH POLYPECTOMY;  Surgeon: Ravinder Uriarte MD;  Location: [facility] END[facility]COPY;  Service: Gastroenterology;  Laterality: N/A;  COLON POLYPS, HEMORRHOIDS   • EYE SURGERY      EYE IMPLANT   • GALLBLADDER SURGERY     • HYSTERECTOMY      PARTIAL   • MASTECTOMY Left    • SUBTOTAL HYSTERECTOMY          SOCX  Social History     Tobacco Use   • Smoking status: Never Smoker   • Smokeless tobacco: Never Used   Substance Use Topics   • Alcohol use: Not Currently     Alcohol/week: 0.0 standard drinks     Comment: RARELY       FAMHX  Family History   Problem Relation Age of Onset   • Heart disease Mother    • Cancer Mother         Colon   • Colon cancer Mother    • Diabetes Father    • Hypertension  "Father    • Alcohol abuse Father    • Heart disease Father    • Breast cancer Sister    • Cancer Sister         Breast   • Cancer Daughter         Breast   • Malig Hyperthermia Neg Hx         MEDSIGONLY  Current Outpatient Medications on File Prior to Visit   Medication Sig   • albuterol sulfate HFA (Ventolin HFA) 108 (90 Base) MCG/ACT inhaler Inhale 2 puffs Every 6 (Six) Hours As Needed for Wheezing.   • aspirin (aspirin) 81 MG EC tablet aspirin 81 mg oral tablet,delayed release (DR/EC) take 1 tablet (81 mg) by oral route once daily   Active   • bimatoprost (Lumigan) 0.01 % ophthalmic drops 1 drop.   • brimonidine-timolol (Combigan) 0.2-0.5 % ophthalmic solution 1 drop.   • Calcium Carbonate 1500 (600 Ca) MG tablet Calcium 600 600 mg (1,500 mg) oral tablet take 1 tablet by oral route daily   Active   • Calcium-Vitamin D-Vitamin K 500-100-40 MG-UNT-MCG chewable tablet Chew.   • coenzyme Q10 100 MG capsule Take 100 mg by mouth Daily.   • Rhopressa 0.02 % solution INSTILL ONE DROP INTO BOTH EYES ONCE DAILY AT BEDTIME.   • [DISCONTINUED] alendronate (FOSAMAX) 70 MG tablet Take 70 mg by mouth 1 (One) Time Per Week.   • [DISCONTINUED] LORazepam (ATIVAN) 1 MG tablet Take 1 tablet by mouth Every 8 (Eight) Hours As Needed for Anxiety.   • [DISCONTINUED] simvastatin (ZOCOR) 10 MG tablet Take 1 tablet by mouth Every Evening.   • [DISCONTINUED] LOW-DOSE ASPIRIN PO Take 81 mg by mouth Daily.   • [DISCONTINUED] Multiple Vitamins-Minerals (PRESERVISION AREDS 2 PO) Take 1 tablet by mouth 2 (two) times a day.   • [DISCONTINUED] multivitamin with minerals (I-andrea) tablet tablet Areds Vitamin Take one by mouth BID   Active     No current facility-administered medications on file prior to visit.       Health Maintenance Due   Topic Date Due   • ZOSTER VACCINE (2 of 3) 08/13/2015       Objective     /69   Pulse 78   Ht 165.1 cm (65\")   Wt 71.2 kg (157 lb)   SpO2 100%   BMI 26.13 kg/m²       Physical Exam  Constitutional:     "   General: She is not in acute distress.     Appearance: Normal appearance. She is not ill-appearing.   HENT:      Head: Normocephalic and atraumatic.      Mouth/Throat:      Pharynx: No oropharyngeal exudate or posterior oropharyngeal erythema.   Cardiovascular:      Rate and Rhythm: Normal rate and regular rhythm.      Heart sounds: Normal heart sounds. No murmur heard.      Pulmonary:      Effort: Pulmonary effort is normal. No respiratory distress.      Breath sounds: Normal breath sounds.   Chest:      Chest wall: No tenderness.   Abdominal:      General: There is no distension.      Palpations: There is no mass.      Tenderness: There is no abdominal tenderness. There is no guarding.   Musculoskeletal:         General: No swelling or tenderness. Normal range of motion.      Cervical back: Normal range of motion and neck supple.      Right hip: Bony tenderness present.      Comments: Pain in right hip over greater trochanteric bursa upon palpation   Skin:     General: Skin is warm and dry.      Findings: No rash.   Neurological:      General: No focal deficit present.      Mental Status: She is alert and oriented to person, place, and time. Mental status is at baseline.      Gait: Gait normal.   Psychiatric:         Mood and Affect: Mood normal.         Behavior: Behavior normal.         Thought Content: Thought content normal.         Judgment: Judgment normal.           Result Review :                           Assessment and Plan        Diagnoses and all orders for this visit:    1. Right hip pain (Primary)  Comments:  if pain not resolving consider imaging or PT  Orders:  -     triamcinolone acetonide (KENALOG-40) injection 60 mg    2. Mixed hyperlipidemia  Comments:  stable on Zocor 10mg, continue  Orders:  -     simvastatin (ZOCOR) 10 MG tablet; Take 1 tablet by mouth Every Evening.  Dispense: 90 tablet; Refill: 1  -     Comprehensive metabolic panel; Future  -     Lipid panel; Future    3.  Anxiety  Comments:  stable on Ativan 1mg prn, continue  Orders:  -     LORazepam (ATIVAN) 1 MG tablet; Take 1 tablet by mouth Every 8 (Eight) Hours As Needed for Anxiety.  Dispense: 30 tablet; Refill: 0    4. Osteoporosis, unspecified osteoporosis type, unspecified pathological fracture presence  Comments:  stablee on Fosamax weekly, continue  Orders:  -     alendronate (FOSAMAX) 70 MG tablet; Take 1 tablet by mouth 1 (One) Time Per Week.  Dispense: 13 tablet; Refill: 1              Follow Up     Return in about 6 months (around 8/16/2022).    Patient was given instructions and counseling regarding her condition or for health maintenance advice. Please see specific information pulled into the AVS if appropriate.     Charla RENATO Holman  reports that she has never smoked. She has never used smokeless tobacco..  Answers for HPI/ROS submitted by the patient on 2/14/2022  Please describe your symptoms.: Routine Checkup and bursitis in right hip  Have you had these symptoms before?: Yes  How long have you been having these symptoms?: Greater than 2 weeks  Please list any medications you are currently taking for this condition.: Aleve  Please describe any probable cause for these symptoms. : Sleeping on that side and weight gain  What is the primary reason for your visit?: Other

## 2022-02-21 ENCOUNTER — LAB (OUTPATIENT)
Dept: LAB | Facility: HOSPITAL | Age: 67
End: 2022-02-21

## 2022-02-21 DIAGNOSIS — E78.2 MIXED HYPERLIPIDEMIA: ICD-10-CM

## 2022-02-21 LAB
ALBUMIN SERPL-MCNC: 4.7 G/DL (ref 3.5–5.2)
ALBUMIN/GLOB SERPL: 1.7 G/DL
ALP SERPL-CCNC: 53 U/L (ref 39–117)
ALT SERPL W P-5'-P-CCNC: 20 U/L (ref 1–33)
ANION GAP SERPL CALCULATED.3IONS-SCNC: 8.1 MMOL/L (ref 5–15)
AST SERPL-CCNC: 18 U/L (ref 1–32)
BILIRUB SERPL-MCNC: 0.7 MG/DL (ref 0–1.2)
BUN SERPL-MCNC: 15 MG/DL (ref 8–23)
BUN/CREAT SERPL: 19.7 (ref 7–25)
CALCIUM SPEC-SCNC: 9.5 MG/DL (ref 8.6–10.5)
CHLORIDE SERPL-SCNC: 104 MMOL/L (ref 98–107)
CHOLEST SERPL-MCNC: 177 MG/DL (ref 0–200)
CO2 SERPL-SCNC: 26.9 MMOL/L (ref 22–29)
CREAT SERPL-MCNC: 0.76 MG/DL (ref 0.57–1)
GFR SERPL CREATININE-BSD FRML MDRD: 76 ML/MIN/1.73
GLOBULIN UR ELPH-MCNC: 2.7 GM/DL
GLUCOSE SERPL-MCNC: 85 MG/DL (ref 65–99)
HDLC SERPL-MCNC: 68 MG/DL (ref 40–60)
LDLC SERPL CALC-MCNC: 97 MG/DL (ref 0–100)
LDLC/HDLC SERPL: 1.43 {RATIO}
POTASSIUM SERPL-SCNC: 4.4 MMOL/L (ref 3.5–5.2)
PROT SERPL-MCNC: 7.4 G/DL (ref 6–8.5)
SODIUM SERPL-SCNC: 139 MMOL/L (ref 136–145)
TRIGL SERPL-MCNC: 60 MG/DL (ref 0–150)
VLDLC SERPL-MCNC: 12 MG/DL (ref 5–40)

## 2022-02-21 PROCEDURE — 80061 LIPID PANEL: CPT

## 2022-02-21 PROCEDURE — 80053 COMPREHEN METABOLIC PANEL: CPT

## 2022-02-21 PROCEDURE — 36415 COLL VENOUS BLD VENIPUNCTURE: CPT

## 2022-02-22 ENCOUNTER — TELEPHONE (OUTPATIENT)
Dept: FAMILY MEDICINE CLINIC | Facility: CLINIC | Age: 67
End: 2022-02-22

## 2022-04-04 ENCOUNTER — TRANSCRIBE ORDERS (OUTPATIENT)
Dept: ADMINISTRATIVE | Facility: HOSPITAL | Age: 67
End: 2022-04-04

## 2022-04-04 DIAGNOSIS — Z92.89 HISTORY OF MAMMOGRAPHY, SCREENING: Primary | ICD-10-CM

## 2022-05-18 DIAGNOSIS — F41.9 ANXIETY: ICD-10-CM

## 2022-05-18 RX ORDER — LORAZEPAM 1 MG/1
1 TABLET ORAL EVERY 8 HOURS PRN
Qty: 60 TABLET | Refills: 0 | Status: SHIPPED | OUTPATIENT
Start: 2022-05-18 | End: 2022-05-20 | Stop reason: SDUPTHER

## 2022-05-18 RX ORDER — LORAZEPAM 1 MG/1
1 TABLET ORAL EVERY 8 HOURS PRN
Qty: 60 TABLET | Refills: 0 | Status: CANCELLED | OUTPATIENT
Start: 2022-05-18

## 2022-05-18 NOTE — TELEPHONE ENCOUNTER
RF request for Ativan 1 mg Q8hrs PRN #30 0 RF to [facility]     LRX 02/16/22     UDS 8/18/21     LOV 02/16/22     F/U 08/17/22

## 2022-05-18 NOTE — TELEPHONE ENCOUNTER
Caller: Charla Holman    Relationship: Self    Best call back number: [phone]    Requested Prescriptions:   Requested Prescriptions     Pending Prescriptions Disp Refills   • LORazepam (ATIVAN) 1 MG tablet 60 tablet 0     Sig: Take 1 tablet by mouth Every 8 (Eight) Hours As Needed for Anxiety.        Pharmacy where request should be sent: [facility] [address] [phone] [facility] [phone] FX     Additional details provided by patient:    THE PATIENT SAID THE MEDICATION WAS SENT TO [facility] WHEN IT SHOULD HAVE GONE TO [facility]       Does the patient have less than a 3 day supply:  [] Yes  [x] No    Pito Scruggs Rep   05/18/22 11:47 EDT

## 2022-05-20 DIAGNOSIS — F41.9 ANXIETY: ICD-10-CM

## 2022-05-20 RX ORDER — LORAZEPAM 1 MG/1
1 TABLET ORAL EVERY 8 HOURS PRN
Qty: 60 TABLET | Refills: 0 | Status: SHIPPED | OUTPATIENT
Start: 2022-05-20 | End: 2022-10-07 | Stop reason: SDUPTHER

## 2022-05-20 NOTE — TELEPHONE ENCOUNTER
PRESCRIPTION WAS SENT TO [facility] IN ERROR ON 05/18/2022 AND NEEDS TO BE RESENT TO [facility].    Called [facility] to cancel.

## 2022-05-20 NOTE — TELEPHONE ENCOUNTER
Caller: Charla Holman    Relationship: Self    Best call back number: [phone]    Requested Prescriptions:   Requested Prescriptions     Pending Prescriptions Disp Refills   • LORazepam (ATIVAN) 1 MG tablet 60 tablet 0     Sig: Take 1 tablet by mouth Every 8 (Eight) Hours As Needed for Anxiety.        Pharmacy where request should be sent: [facility] [address] [phone] [facility] [phone] FX     Additional details provided by patient: PRESCRIPTION WAS SENT TO [facility] IN ERROR ON 05/18/2022 AND NEEDS TO BE RESENT TO [facility], PLEASE CONTACT PATIENT WHEN IT HAS BEEN RESENT    Does the patient have less than a 3 day supply:  [] Yes  [x] No    Pito Crooks Rep   05/20/22 10:58 EDT

## 2022-06-02 ENCOUNTER — TELEPHONE (OUTPATIENT)
Dept: FAMILY MEDICINE CLINIC | Facility: CLINIC | Age: 67
End: 2022-06-02

## 2022-06-02 ENCOUNTER — HOSPITAL ENCOUNTER (OUTPATIENT)
Dept: MAMMOGRAPHY | Facility: HOSPITAL | Age: 67
Discharge: HOME | End: 2022-06-02
Admitting: NURSE PRACTITIONER

## 2022-06-02 DIAGNOSIS — Z92.89 HISTORY OF MAMMOGRAPHY, SCREENING: ICD-10-CM

## 2022-06-02 PROCEDURE — 77063 BREAST TOMOSYNTHESIS BI: CPT

## 2022-06-02 PROCEDURE — 77067 SCR MAMMO BI INCL CAD: CPT

## 2022-06-02 NOTE — TELEPHONE ENCOUNTER
----- Message from SRIRAM Laboy sent at 6/2/2022 11:51 AM EDT -----  Benign, recommend repeat in 1 yeaer

## 2022-08-16 RX ORDER — DORZOLAMIDE HCL 20 MG/ML
SOLUTION/ DROPS OPHTHALMIC
COMMUNITY
Start: 2022-05-16

## 2022-08-17 ENCOUNTER — TELEMEDICINE (OUTPATIENT)
Dept: FAMILY MEDICINE CLINIC | Facility: CLINIC | Age: 67
End: 2022-08-17

## 2022-08-17 VITALS — WEIGHT: 157 LBS | HEIGHT: 65 IN | BODY MASS INDEX: 26.16 KG/M2

## 2022-08-17 DIAGNOSIS — F41.9 ANXIETY: ICD-10-CM

## 2022-08-17 DIAGNOSIS — E78.2 MIXED HYPERLIPIDEMIA: Primary | ICD-10-CM

## 2022-08-17 DIAGNOSIS — M85.80 OSTEOPENIA, UNSPECIFIED LOCATION: ICD-10-CM

## 2022-08-17 DIAGNOSIS — J45.909 ASTHMA, UNSPECIFIED ASTHMA SEVERITY, UNSPECIFIED WHETHER COMPLICATED, UNSPECIFIED WHETHER PERSISTENT: ICD-10-CM

## 2022-08-17 PROCEDURE — 99214 OFFICE O/P EST MOD 30 MIN: CPT | Performed by: NURSE PRACTITIONER

## 2022-08-17 NOTE — PROGRESS NOTES
You have chosen to receive care through a telehealth visit.  Do you consent to use a video/audio connection for your medical care today? Yes      Chief Complaint  Hyperlipidemia, Anxiety, and Asthma, osteopenia    Subjective            Charla Holman presents to [facility]  Pt is a f/u for HLD, anxiety, and asthma. No issues or concern at this time.     Pt does not need refills at this time.    Pt is due labs.     Last Dexa 2021.    Pt is due MAW, will schedule.        Past Medical History:   Diagnosis Date   • Acute cystitis 09/19/2014   • Anxiety    • Asthma 02/21/2014   • Breast cancer (HCC)    • Cancer (HCC)    • Glaucoma    • High cholesterol 02/21/2014   • Hyperlipemia    • Limb swelling    • Microscopic hematuria    • UTI (urinary tract infection)        Allergies   Allergen Reactions   • Codeine Unknown - Low Severity   • Macrobid [Nitrofurantoin] Unknown - Low Severity        Past Surgical History:   Procedure Laterality Date   • APPENDECTOMY  2006   • BREAST SURGERY  2007   • CHOLECYSTECTOMY  2000   • COLONOSCOPY  01/11/2016    DR. RIVERA GODINEZ   • COLONOSCOPY N/A 8/23/2021    Procedure: COLONOSCOPY WITH POLYPECTOMY;  Surgeon: Ravinder Uriarte MD;  Location: [facility];  Service: Gastroenterology;  Laterality: N/A;  COLON POLYPS, HEMORRHOIDS   • EYE SURGERY      EYE IMPLANT   • GALLBLADDER SURGERY     • HYSTERECTOMY      PARTIAL   • MASTECTOMY Left    • SUBTOTAL HYSTERECTOMY          Social History     Tobacco Use   • Smoking status: Never Smoker   • Smokeless tobacco: Never Used   Substance Use Topics   • Alcohol use: Not Currently     Alcohol/week: 0.0 standard drinks     Comment: RARELY       Family History   Problem Relation Age of Onset   • Heart disease Mother    • Cancer Mother         Colon   • Colon cancer Mother    • Diabetes Father    • Hypertension Father    • Alcohol abuse Father    • Heart disease Father    • Breast cancer Sister    • Cancer  "Sister         Breast   • Cancer Daughter         Breast   • Malig Hyperthermia Neg Hx         Current Outpatient Medications on File Prior to Visit   Medication Sig   • albuterol sulfate HFA (Ventolin HFA) 108 (90 Base) MCG/ACT inhaler Inhale 2 puffs Every 6 (Six) Hours As Needed for Wheezing.   • alendronate (FOSAMAX) 70 MG tablet Take 1 tablet by mouth 1 (One) Time Per Week.   • aspirin 81 MG EC tablet aspirin 81 mg oral tablet,delayed release (DR/EC) take 1 tablet (81 mg) by oral route once daily   Active   • bimatoprost (Lumigan) 0.01 % ophthalmic drops 1 drop.   • brimonidine-timolol (Combigan) 0.2-0.5 % ophthalmic solution 1 drop.   • Calcium-Vitamin D-Vitamin K 500-100-40 MG-UNT-MCG chewable tablet Chew.   • coenzyme Q10 100 MG capsule Take 100 mg by mouth Daily.   • dorzolamide (TRUSOPT) 2 % ophthalmic solution    • LORazepam (ATIVAN) 1 MG tablet Take 1 tablet by mouth Every 8 (Eight) Hours As Needed for Anxiety.   • Rhopressa 0.02 % solution INSTILL ONE DROP INTO BOTH EYES ONCE DAILY AT BEDTIME.   • simvastatin (ZOCOR) 10 MG tablet Take 1 tablet by mouth Every Evening.   • Calcium Carbonate 1500 (600 Ca) MG tablet Calcium 600 600 mg (1,500 mg) oral tablet take 1 tablet by oral route daily   Active     No current facility-administered medications on file prior to visit.       Health Maintenance Due   Topic Date Due   • ZOSTER VACCINE (2 of 3) 08/13/2015   • ANNUAL WELLNESS VISIT  08/16/2022       Objective     Ht 165.1 cm (65\")   Wt 71.2 kg (157 lb)   BMI 26.13 kg/m²       Physical Exam  Neurological:      Mental Status: She is alert.   Psychiatric:         Attention and Perception: Attention normal.         Mood and Affect: Mood normal.         Speech: Speech normal.         Behavior: Behavior normal. Behavior is cooperative.         Thought Content: Thought content normal.         Judgment: Judgment normal.           Result Review :                           Assessment and Plan        Diagnoses and all " orders for this visit:    1. Mixed hyperlipidemia (Primary)  Comments:  stable on zocor 10mg, continue  Orders:  -     Comprehensive metabolic panel; Future  -     Lipid panel; Future    2. Asthma, unspecified asthma severity, unspecified whether complicated, unspecified whether persistent  Comments:  stable on albuterol HFR inhaler prn, continue  Orders:  -     CBC w AUTO Differential; Future    3. Anxiety  Comments:  stable on ativan 1mg prn, continue    4. Osteopenia, unspecified location  Comments:  stableon fosamax 70mg, continue will repeat Dexa in 2023, last one done in 2021              Follow Up     Return in about 6 months (around 2/17/2023).    Patient was given instructions and counseling regarding her condition or for health maintenance advice. Please see specific information pulled into the AVS if appropriate.     Charla Holman  reports that she has never smoked. She has never used smokeless tobacco..

## 2022-08-22 ENCOUNTER — TELEMEDICINE (OUTPATIENT)
Dept: FAMILY MEDICINE CLINIC | Facility: CLINIC | Age: 67
End: 2022-08-22

## 2022-08-22 VITALS — WEIGHT: 157 LBS | BODY MASS INDEX: 26.16 KG/M2 | HEIGHT: 65 IN

## 2022-08-22 DIAGNOSIS — H92.02 LEFT EAR PAIN: ICD-10-CM

## 2022-08-22 DIAGNOSIS — R09.81 SINUS CONGESTION: ICD-10-CM

## 2022-08-22 DIAGNOSIS — J34.89 SINUS DRAINAGE: ICD-10-CM

## 2022-08-22 DIAGNOSIS — J32.0 MAXILLARY SINUSITIS, UNSPECIFIED CHRONICITY: Primary | ICD-10-CM

## 2022-08-22 PROCEDURE — 99213 OFFICE O/P EST LOW 20 MIN: CPT | Performed by: NURSE PRACTITIONER

## 2022-08-22 RX ORDER — AZITHROMYCIN 250 MG/1
TABLET, FILM COATED ORAL
Qty: 6 TABLET | Refills: 0 | Status: SHIPPED | OUTPATIENT
Start: 2022-08-22 | End: 2022-09-20

## 2022-08-22 NOTE — PROGRESS NOTES
You have chosen to receive care through a telehealth visit.  Do you consent to use a video/audio connection for your medical care today? Yes    Chief Complaint  Sinus Problem and Earache    Subjective            Charla Holman presents to [facility]  Pt has c/o possible sinus infection. Pt has been having sinus drainage, sinus congestion, and (L) ear pain for 4 days. Pt has been taking advil sinus with little relief. Pt denies any fever, SOA, or contact with anyone with Covid. She has took 2 negative covid tests at home.        Past Medical History:   Diagnosis Date   • Acute cystitis 09/19/2014   • Anxiety    • Asthma 02/21/2014   • Breast cancer (HCC)    • Cancer (HCC)    • Glaucoma    • High cholesterol 02/21/2014   • Hyperlipemia    • Limb swelling    • Microscopic hematuria    • UTI (urinary tract infection)        Allergies   Allergen Reactions   • Norco [Hydrocodone-Acetaminophen] Headache   • Codeine Unknown - Low Severity   • Macrobid [Nitrofurantoin] Unknown - Low Severity        Past Surgical History:   Procedure Laterality Date   • APPENDECTOMY  2006   • BREAST SURGERY  2007   • CHOLECYSTECTOMY  2000   • COLONOSCOPY  01/11/2016    DR. RIVERA GODINEZ   • COLONOSCOPY N/A 8/23/2021    Procedure: COLONOSCOPY WITH POLYPECTOMY;  Surgeon: Ravinder Uriarte MD;  [facility];  Service: Gastroenterology;  Laterality: N/A;  COLON POLYPS, HEMORRHOIDS   • EYE SURGERY      EYE IMPLANT   • GALLBLADDER SURGERY     • HYSTERECTOMY      PARTIAL   • MASTECTOMY Left    • SUBTOTAL HYSTERECTOMY          Social History     Tobacco Use   • Smoking status: Never Smoker   • Smokeless tobacco: Never Used   Substance Use Topics   • Alcohol use: Not Currently     Alcohol/week: 0.0 standard drinks     Comment: RARELY       Family History   Problem Relation Age of Onset   • Heart disease Mother    • Cancer Mother         Colon   • Colon cancer Mother    • Diabetes Father    •  "Hypertension Father    • Alcohol abuse Father    • Heart disease Father    • Breast cancer Sister    • Cancer Sister         Breast   • Cancer Daughter         Breast   • Malig Hyperthermia Neg Hx         Current Outpatient Medications on File Prior to Visit   Medication Sig   • albuterol sulfate HFA (Ventolin HFA) 108 (90 Base) MCG/ACT inhaler Inhale 2 puffs Every 6 (Six) Hours As Needed for Wheezing.   • alendronate (FOSAMAX) 70 MG tablet Take 1 tablet by mouth 1 (One) Time Per Week.   • aspirin 81 MG EC tablet aspirin 81 mg oral tablet,delayed release (DR/EC) take 1 tablet (81 mg) by oral route once daily   Active   • bimatoprost (Lumigan) 0.01 % ophthalmic drops 1 drop.   • brimonidine-timolol (Combigan) 0.2-0.5 % ophthalmic solution 1 drop.   • Calcium Carbonate 1500 (600 Ca) MG tablet Calcium 600 600 mg (1,500 mg) oral tablet take 1 tablet by oral route daily   Active   • Calcium-Vitamin D-Vitamin K 500-100-40 MG-UNT-MCG chewable tablet Chew.   • coenzyme Q10 100 MG capsule Take 100 mg by mouth Daily.   • dorzolamide (TRUSOPT) 2 % ophthalmic solution    • LORazepam (ATIVAN) 1 MG tablet Take 1 tablet by mouth Every 8 (Eight) Hours As Needed for Anxiety.   • Rhopressa 0.02 % solution INSTILL ONE DROP INTO BOTH EYES ONCE DAILY AT BEDTIME.   • simvastatin (ZOCOR) 10 MG tablet Take 1 tablet by mouth Every Evening.     No current facility-administered medications on file prior to visit.       Health Maintenance Due   Topic Date Due   • ZOSTER VACCINE (2 of 3) 08/13/2015   • ANNUAL WELLNESS VISIT  08/16/2022       Objective     Ht 165.1 cm (65\")   Wt 71.2 kg (157 lb)   BMI 26.13 kg/m²       Physical Exam  Psychiatric:         Attention and Perception: Attention normal.         Mood and Affect: Mood and affect normal.         Speech: Speech normal.         Behavior: Behavior normal. Behavior is cooperative.         Thought Content: Thought content normal.         Judgment: Judgment normal.           Result Review : "                           Assessment and Plan        Diagnoses and all orders for this visit:    1. Maxillary sinusitis, unspecified chronicity (Primary)  -     azithromycin (Zithromax Z-Gordon) 250 MG tablet; Take 2 tablets by mouth on day 1, then 1 tablet daily on days 2-5  Dispense: 6 tablet; Refill: 0    2. Sinus congestion  -     azithromycin (Zithromax Z-Gordon) 250 MG tablet; Take 2 tablets by mouth on day 1, then 1 tablet daily on days 2-5  Dispense: 6 tablet; Refill: 0    3. Sinus drainage  -     azithromycin (Zithromax Z-Gordon) 250 MG tablet; Take 2 tablets by mouth on day 1, then 1 tablet daily on days 2-5  Dispense: 6 tablet; Refill: 0    4. Left ear pain  -     azithromycin (Zithromax Z-Gordon) 250 MG tablet; Take 2 tablets by mouth on day 1, then 1 tablet daily on days 2-5  Dispense: 6 tablet; Refill: 0              Follow Up     Return if symptoms worsen or fail to improve.    Patient was given instructions and counseling regarding her condition or for health maintenance advice. Please see specific information pulled into the AVS if appropriate.     Charla Holman  reports that she has never smoked. She has never used smokeless tobacco..

## 2022-08-25 ENCOUNTER — TELEPHONE (OUTPATIENT)
Dept: FAMILY MEDICINE CLINIC | Facility: CLINIC | Age: 67
End: 2022-08-25

## 2022-08-25 DIAGNOSIS — R09.81 SINUS CONGESTION: Primary | ICD-10-CM

## 2022-08-25 RX ORDER — METHYLPREDNISOLONE 4 MG/1
1 TABLET ORAL DAILY
Qty: 21 TABLET | Refills: 0 | Status: SHIPPED | OUTPATIENT
Start: 2022-08-25 | End: 2022-09-20

## 2022-08-25 NOTE — TELEPHONE ENCOUNTER
Caller: Charla Holman    Relationship: Self    Best call back number: [phone]     What medication are you requesting: SOMETHING TO TREAT SINUS INFECTION     What are your current symptoms: SINUS       If a prescription is needed, what is your preferred pharmacy and phone number: [place], [place] - [address] [place] [address] [phone] [facility] [phone] FX     Additional notes: PATIENT STATED THAT CYDNEY SAID SHE WOULD GIVE HER SOMETHING ELSE IF THE MEDICATION DIDN'T WORK. PLEASE CALL AND ADVISE.

## 2022-09-08 ENCOUNTER — LAB (OUTPATIENT)
Dept: LAB | Facility: HOSPITAL | Age: 67
End: 2022-09-08

## 2022-09-08 DIAGNOSIS — E78.2 MIXED HYPERLIPIDEMIA: ICD-10-CM

## 2022-09-08 DIAGNOSIS — J45.909 ASTHMA, UNSPECIFIED ASTHMA SEVERITY, UNSPECIFIED WHETHER COMPLICATED, UNSPECIFIED WHETHER PERSISTENT: ICD-10-CM

## 2022-09-08 LAB
ALBUMIN SERPL-MCNC: 4.4 G/DL (ref 3.5–5.2)
ALBUMIN/GLOB SERPL: 1.7 G/DL
ALP SERPL-CCNC: 57 U/L (ref 39–117)
ALT SERPL W P-5'-P-CCNC: 15 U/L (ref 1–33)
ANION GAP SERPL CALCULATED.3IONS-SCNC: 7.7 MMOL/L (ref 5–15)
AST SERPL-CCNC: 21 U/L (ref 1–32)
BASOPHILS # BLD AUTO: 0.05 10*3/MM3 (ref 0–0.2)
BASOPHILS NFR BLD AUTO: 0.9 % (ref 0–1.5)
BILIRUB SERPL-MCNC: 0.6 MG/DL (ref 0–1.2)
BUN SERPL-MCNC: 9 MG/DL (ref 8–23)
BUN/CREAT SERPL: 10.6 (ref 7–25)
CALCIUM SPEC-SCNC: 9.6 MG/DL (ref 8.6–10.5)
CHLORIDE SERPL-SCNC: 104 MMOL/L (ref 98–107)
CHOLEST SERPL-MCNC: 186 MG/DL (ref 0–200)
CO2 SERPL-SCNC: 26.3 MMOL/L (ref 22–29)
CREAT SERPL-MCNC: 0.85 MG/DL (ref 0.57–1)
DEPRECATED RDW RBC AUTO: 47.6 FL (ref 37–54)
EGFRCR SERPLBLD CKD-EPI 2021: 74.7 ML/MIN/1.73
EOSINOPHIL # BLD AUTO: 0.32 10*3/MM3 (ref 0–0.4)
EOSINOPHIL NFR BLD AUTO: 5.7 % (ref 0.3–6.2)
ERYTHROCYTE [DISTWIDTH] IN BLOOD BY AUTOMATED COUNT: 13.2 % (ref 12.3–15.4)
GLOBULIN UR ELPH-MCNC: 2.6 GM/DL
GLUCOSE SERPL-MCNC: 89 MG/DL (ref 65–99)
HCT VFR BLD AUTO: 40.3 % (ref 34–46.6)
HDLC SERPL-MCNC: 79 MG/DL (ref 40–60)
HGB BLD-MCNC: 13.2 G/DL (ref 12–15.9)
IMM GRANULOCYTES # BLD AUTO: 0.01 10*3/MM3 (ref 0–0.05)
IMM GRANULOCYTES NFR BLD AUTO: 0.2 % (ref 0–0.5)
LDLC SERPL CALC-MCNC: 95 MG/DL (ref 0–100)
LDLC/HDLC SERPL: 1.2 {RATIO}
LYMPHOCYTES # BLD AUTO: 1.22 10*3/MM3 (ref 0.7–3.1)
LYMPHOCYTES NFR BLD AUTO: 21.7 % (ref 19.6–45.3)
MCH RBC QN AUTO: 31.9 PG (ref 26.6–33)
MCHC RBC AUTO-ENTMCNC: 32.8 G/DL (ref 31.5–35.7)
MCV RBC AUTO: 97.3 FL (ref 79–97)
MONOCYTES # BLD AUTO: 0.52 10*3/MM3 (ref 0.1–0.9)
MONOCYTES NFR BLD AUTO: 9.2 % (ref 5–12)
NEUTROPHILS NFR BLD AUTO: 3.51 10*3/MM3 (ref 1.7–7)
NEUTROPHILS NFR BLD AUTO: 62.3 % (ref 42.7–76)
NRBC BLD AUTO-RTO: 0 /100 WBC (ref 0–0.2)
PLATELET # BLD AUTO: 249 10*3/MM3 (ref 140–450)
PMV BLD AUTO: 11.5 FL (ref 6–12)
POTASSIUM SERPL-SCNC: 4.4 MMOL/L (ref 3.5–5.2)
PROT SERPL-MCNC: 7 G/DL (ref 6–8.5)
RBC # BLD AUTO: 4.14 10*6/MM3 (ref 3.77–5.28)
SODIUM SERPL-SCNC: 138 MMOL/L (ref 136–145)
TRIGL SERPL-MCNC: 62 MG/DL (ref 0–150)
VLDLC SERPL-MCNC: 12 MG/DL (ref 5–40)
WBC NRBC COR # BLD: 5.63 10*3/MM3 (ref 3.4–10.8)

## 2022-09-08 PROCEDURE — 80053 COMPREHEN METABOLIC PANEL: CPT

## 2022-09-08 PROCEDURE — 80061 LIPID PANEL: CPT

## 2022-09-08 PROCEDURE — 36415 COLL VENOUS BLD VENIPUNCTURE: CPT

## 2022-09-08 PROCEDURE — 85025 COMPLETE CBC W/AUTO DIFF WBC: CPT

## 2022-09-20 ENCOUNTER — OFFICE VISIT (OUTPATIENT)
Dept: FAMILY MEDICINE CLINIC | Facility: CLINIC | Age: 67
End: 2022-09-20

## 2022-09-20 VITALS — BODY MASS INDEX: 26.16 KG/M2 | HEIGHT: 65 IN | WEIGHT: 157 LBS

## 2022-09-20 DIAGNOSIS — Z00.00 MEDICARE ANNUAL WELLNESS VISIT, SUBSEQUENT: Primary | ICD-10-CM

## 2022-09-20 PROCEDURE — 1160F RVW MEDS BY RX/DR IN RCRD: CPT | Performed by: NURSE PRACTITIONER

## 2022-09-20 PROCEDURE — G0439 PPPS, SUBSEQ VISIT: HCPCS | Performed by: NURSE PRACTITIONER

## 2022-09-20 PROCEDURE — 1170F FXNL STATUS ASSESSED: CPT | Performed by: NURSE PRACTITIONER

## 2022-09-20 NOTE — PROGRESS NOTES
You have chosen to receive care through a telehealth visit.  Do you consent to use a video/audio connection for your medical care today? Yes    Mode of visit: video    Location of patient: home, [place], [place]    Location of provider: [facility]       The ABCs of the Annual Wellness Visit  Subsequent Medicare Wellness Visit    Chief Complaint   Patient presents with   • Medicare Wellness-subsequent      Subjective    History of Present Illness:  Charla Holman is a 67 y.o. female who presents for a Subsequent Medicare Wellness Visit.    The following portions of the patient's history were reviewed and   updated as appropriate: allergies, current medications, past family history, past medical history, past social history, past surgical history and problem list.    Compared to one year ago, the patient feels her physical   health is the same.    Compared to one year ago, the patient feels her mental   health is the same.    Recent Hospitalizations:  She was not admitted to the hospital during the last year.       Current Medical Providers:  Patient Care Team:  Marylou Hodgson APRN as PCP - General (Nurse Practitioner)    Outpatient Medications Prior to Visit   Medication Sig Dispense Refill   • albuterol sulfate HFA (Ventolin HFA) 108 (90 Base) MCG/ACT inhaler Inhale 2 puffs Every 6 (Six) Hours As Needed for Wheezing. 18 g 1   • alendronate (FOSAMAX) 70 MG tablet Take 1 tablet by mouth 1 (One) Time Per Week. 13 tablet 1   • aspirin 81 MG EC tablet aspirin 81 mg oral tablet,delayed release (DR/EC) take 1 tablet (81 mg) by oral route once daily   Active     • bimatoprost (Lumigan) 0.01 % ophthalmic drops 1 drop.     • brimonidine-timolol (Combigan) 0.2-0.5 % ophthalmic solution 1 drop.     • Calcium Carbonate 1500 (600 Ca) MG tablet Calcium 600 600 mg (1,500 mg) oral tablet take 1 tablet by oral route daily   Active     • Calcium-Vitamin D-Vitamin K 500-100-40 MG-UNT-MCG chewable tablet Chew.     • coenzyme Q10  "100 MG capsule Take 100 mg by mouth Daily.     • dorzolamide (TRUSOPT) 2 % ophthalmic solution      • LORazepam (ATIVAN) 1 MG tablet Take 1 tablet by mouth Every 8 (Eight) Hours As Needed for Anxiety. 60 tablet 0   • Rhopressa 0.02 % solution INSTILL ONE DROP INTO BOTH EYES ONCE DAILY AT BEDTIME.     • simvastatin (ZOCOR) 10 MG tablet Take 1 tablet by mouth Every Evening. 90 tablet 1   • azithromycin (Zithromax Z-Gordon) 250 MG tablet Take 2 tablets by mouth on day 1, then 1 tablet daily on days 2-5 6 tablet 0   • methylPREDNISolone (MEDROL) 4 MG dose pack Take 1 tablet by mouth Daily. Take as directed on package instructions. 21 tablet 0     No facility-administered medications prior to visit.       No opioid medication identified on active medication list. I have reviewed chart for other potential  high risk medication/s and harmful drug interactions in the elderly.          Aspirin is on active medication list. Aspirin use is indicated based on review of current medical condition/s. Pros and cons of this therapy have been discussed today. Benefits of this medication outweigh potential harm.  Patient has been encouraged to continue taking this medication.  .      Patient Active Problem List   Diagnosis   • Acute cystitis   • Anxiety   • Asthma   • Cancer (HCC)   • Mixed hyperlipidemia   • Malignant neoplasm of breast (female) (HCC)     Advance Care Planning  Advance Directive is not on file.  ACP discussion was held with the patient during this visit. Patient does not have an advance directive, information provided.          Objective    Vitals:    09/20/22 1012   Weight: 71.2 kg (157 lb)   Height: 165.1 cm (65\")     Estimated body mass index is 26.13 kg/m² as calculated from the following:    Height as of this encounter: 165.1 cm (65\").    Weight as of this encounter: 71.2 kg (157 lb).    BMI is >= 25 and <30. (Overweight) The following options were offered after discussion;: none (medical " contraindication)      Does the patient have evidence of cognitive impairment? No      Lab Results   Component Value Date    TRIG 62 09/08/2022    HDL 79 (H) 09/08/2022    LDL 95 09/08/2022    VLDL 12 09/08/2022            HEALTH RISK ASSESSMENT    Smoking Status:  Social History     Tobacco Use   Smoking Status Never Smoker   Smokeless Tobacco Never Used     Alcohol Consumption:  Social History     Substance and Sexual Activity   Alcohol Use Not Currently   • Alcohol/week: 0.0 standard drinks    Comment: RARELY     Fall Risk Screen:    GABIADI Fall Risk Assessment was completed, and patient is at LOW risk for falls.Assessment completed on:9/20/2022    Depression Screening:  PHQ-2/PHQ-9 Depression Screening 9/20/2022   Retired PHQ-9 Total Score -   Retired Total Score -   Little Interest or Pleasure in Doing Things 0-->not at all   Feeling Down, Depressed or Hopeless 0-->not at all   PHQ-9: Brief Depression Severity Measure Score 0       Health Habits and Functional and Cognitive Screening:  Functional & Cognitive Status 9/20/2022   Do you have difficulty preparing food and eating? No   Do you have difficulty bathing yourself, getting dressed or grooming yourself? No   Do you have difficulty using the toilet? No   Do you have difficulty moving around from place to place? No   Do you have trouble with steps or getting out of a bed or a chair? No   Current Diet Well Balanced Diet   Dental Exam Up to date   Eye Exam Up to date   Exercise (times per week) 3 times per week   Current Exercises Include Walking   Do you need help using the phone?  No   Are you deaf or do you have serious difficulty hearing?  No   Do you need help with transportation? No   Do you need help shopping? No   Do you need help preparing meals?  No   Do you need help with housework?  No   Do you need help with laundry? No   Do you need help taking your medications? No   Do you need help managing money? No   Do you ever drive or ride in a car without  wearing a seat belt? No   Have you felt unusual stress, anger or loneliness in the last month? No   Who do you live with? Spouse   If you need help, do you have trouble finding someone available to you? No   Have you been bothered in the last four weeks by sexual problems? No   Do you have difficulty concentrating, remembering or making decisions? No       Age-appropriate Screening Schedule:  Refer to the list below for future screening recommendations based on patient's age, sex and/or medical conditions. Orders for these recommended tests are listed in the plan section. The patient has been provided with a written plan.    Health Maintenance   Topic Date Due   • ZOSTER VACCINE (2 of 3) 08/13/2015   • INFLUENZA VACCINE  10/01/2022   • MAMMOGRAM  06/02/2023   • LIPID PANEL  09/08/2023   • DXA SCAN  11/30/2023   • TDAP/TD VACCINES (2 - Td or Tdap) 05/17/2026              Assessment & Plan   CMS Preventative Services Quick Reference  Risk Factors Identified During Encounter  None Identified  The above risks/problems have been discussed with the patient.  Follow up actions/plans if indicated are seen below in the Assessment/Plan Section.  Pertinent information has been shared with the patient in the After Visit Summary.    Diagnoses and all orders for this visit:    1. Medicare annual wellness visit, subsequent (Primary)        Follow Up:   Return in about 1 year (around 9/20/2023) for Medicare Wellness.     An After Visit Summary and PPPS were made available to the patient.

## 2022-10-07 ENCOUNTER — LAB (OUTPATIENT)
Dept: FAMILY MEDICINE CLINIC | Facility: CLINIC | Age: 67
End: 2022-10-07

## 2022-10-07 DIAGNOSIS — F41.9 ANXIETY: ICD-10-CM

## 2022-10-07 DIAGNOSIS — Z79.899 LONG TERM USE OF DRUG: Primary | ICD-10-CM

## 2022-10-07 PROCEDURE — 80305 DRUG TEST PRSMV DIR OPT OBS: CPT | Performed by: NURSE PRACTITIONER

## 2022-10-08 RX ORDER — LORAZEPAM 1 MG/1
1 TABLET ORAL EVERY 8 HOURS PRN
Qty: 60 TABLET | Refills: 0 | Status: SHIPPED | OUTPATIENT
Start: 2022-10-08 | End: 2023-02-27 | Stop reason: SDUPTHER

## 2022-10-11 ENCOUNTER — PATIENT MESSAGE (OUTPATIENT)
Dept: FAMILY MEDICINE CLINIC | Facility: CLINIC | Age: 67
End: 2022-10-11

## 2022-10-11 DIAGNOSIS — F41.9 ANXIETY: ICD-10-CM

## 2022-10-12 NOTE — TELEPHONE ENCOUNTER
From: Charla Holman  To: SRIRAM Laboy  Sent: 10/11/2022 6:09 PM EDT  Subject: Lorazepam    Please send a prescription for Lorazepam to [facility] please. Thank you

## 2022-10-20 DIAGNOSIS — M81.0 OSTEOPOROSIS, UNSPECIFIED OSTEOPOROSIS TYPE, UNSPECIFIED PATHOLOGICAL FRACTURE PRESENCE: ICD-10-CM

## 2022-10-20 RX ORDER — ALENDRONATE SODIUM 70 MG/1
TABLET ORAL
Qty: 12 TABLET | Refills: 0 | Status: SHIPPED | OUTPATIENT
Start: 2022-10-20 | End: 2023-01-26

## 2022-11-09 ENCOUNTER — HOSPITAL ENCOUNTER (OUTPATIENT)
Dept: GENERAL RADIOLOGY | Facility: HOSPITAL | Age: 67
Discharge: HOME | End: 2022-11-09
Admitting: PHYSICIAN ASSISTANT

## 2022-11-09 ENCOUNTER — OFFICE VISIT (OUTPATIENT)
Dept: FAMILY MEDICINE CLINIC | Facility: CLINIC | Age: 67
End: 2022-11-09

## 2022-11-09 VITALS
BODY MASS INDEX: 26.02 KG/M2 | SYSTOLIC BLOOD PRESSURE: 147 MMHG | WEIGHT: 156.2 LBS | HEART RATE: 70 BPM | OXYGEN SATURATION: 98 % | DIASTOLIC BLOOD PRESSURE: 65 MMHG | HEIGHT: 65 IN

## 2022-11-09 DIAGNOSIS — T14.8XXA HEMATOMA: ICD-10-CM

## 2022-11-09 DIAGNOSIS — M79.89 LEFT ARM SWELLING: ICD-10-CM

## 2022-11-09 DIAGNOSIS — M79.602 LEFT ARM PAIN: Primary | ICD-10-CM

## 2022-11-09 DIAGNOSIS — M79.602 LEFT ARM PAIN: ICD-10-CM

## 2022-11-09 PROCEDURE — 99212 OFFICE O/P EST SF 10 MIN: CPT | Performed by: PHYSICIAN ASSISTANT

## 2022-11-09 PROCEDURE — 73090 X-RAY EXAM OF FOREARM: CPT

## 2022-11-09 NOTE — PROGRESS NOTES
Chief Complaint  Chief Complaint   Patient presents with   • Bleeding/Bruising     arm   • Edema     arm       Subjective          Charla Holman presents to [facility]  History of Present Illness     Patient is here today for Bruising and swelling on her left forearm. Patient states that she fell on on 10/29/22; tripped at daughter's house. She fell onto her left arm. Unsure if she hit anything. She states that since then, it has had swelling and bruising and tenderness to the touch. She states that she had a left mastectomy in the past, so she normally doesn't lift items with that arm, but other movements hurt. She is on Fosamax for osteopenia and ASA 81mg daily.    Medical History: has a past medical history of Acute cystitis (09/19/2014), Anxiety, Asthma (02/21/2014), Breast cancer (HCC), Cancer (HCC), Glaucoma, High cholesterol (02/21/2014), Hyperlipemia, Limb swelling, Microscopic hematuria, and UTI (urinary tract infection).   Surgical History: has a past surgical history that includes Appendectomy (2006); Cholecystectomy (2000); Colonoscopy (01/11/2016); Gallbladder surgery; Hysterectomy; Mastectomy (Left); Eye surgery; Colonoscopy (N/A, 8/23/2021); Breast surgery (2007); and Subtotal Hysterectomy.   Family History: family history includes Alcohol abuse in her father; Breast cancer in her sister; Cancer in her daughter, mother, and sister; Colon cancer in her mother; Diabetes in her father; Heart disease in her father and mother; Hypertension in her father.   Social History: reports that she has never smoked. She has never used smokeless tobacco. She reports that she does not currently use alcohol. She reports that she does not use drugs.    Allergies: Norco [hydrocodone-acetaminophen], Codeine, and Macrobid [nitrofurantoin]    Health Maintenance Due   Topic Date Due   • ZOSTER VACCINE (2 of 3) 08/13/2015   • COVID-19 Vaccine (5 - Booster for Moderna series) 07/19/2022  "      Immunization History   Administered Date(s) Administered   • COVID-19 (MODERNA) 1st, 2nd, 3rd Dose Only 03/03/2021, 03/03/2021, 03/31/2021, 03/31/2021, 10/28/2021, 05/24/2022   • Flu Vaccine Intradermal Quad 18-64YR 10/16/2018   • Flu Vaccine Quad PF >36MO 10/18/2016, 11/06/2017, 11/06/2017   • Fluad Quad 65+ 11/11/2021   • Fluzone Quad >6mos (Multi-dose) 10/01/2013, 10/16/2018, 11/01/2019   • Influenza TIV (IM) 10/20/2014, 10/18/2016   • Influenza, Unspecified 10/16/2018, 11/01/2019   • Pneumococcal Polysaccharide (PPSV23) 10/01/2007, 10/01/2007   • Tdap 05/17/2016, 05/17/2016   • Zostavax 06/18/2015, 06/18/2015       Objective     Vitals:    11/09/22 1001   BP: 147/65   Pulse: 70   SpO2: 98%   Weight: 70.9 kg (156 lb 3.2 oz)   Height: 165.1 cm (65\")     Body mass index is 25.99 kg/m².     Wt Readings from Last 3 Encounters:   11/09/22 70.9 kg (156 lb 3.2 oz)   09/20/22 71.2 kg (157 lb)   08/22/22 71.2 kg (157 lb)     BP Readings from Last 3 Encounters:   11/09/22 147/65   02/16/22 147/69   08/23/21 159/86         Patient Care Team:  Marylou Hodgson APRN as PCP - General (Nurse Practitioner)    Physical Exam  Constitutional:       Appearance: Normal appearance.   Pulmonary:      Effort: Pulmonary effort is normal.      Breath sounds: Normal breath sounds.   Musculoskeletal:      Left forearm: Swelling and tenderness present.        Arms:    Neurological:      Mental Status: She is alert.           Result Review :                           Assessment and Plan      Diagnoses and all orders for this visit:    1. Left arm pain (Primary)  -     XR Forearm 2 View Left; Future    2. Left arm swelling  -     XR Forearm 2 View Left; Future    3. Hematoma  -     XR Forearm 2 View Left; Future    PLAN: check xray; if negative, apply heat to hematoma and rest.        Follow Up     Return if symptoms worsen or fail to improve.    Patient was given instructions and counseling regarding her condition or for health " maintenance advice. Please see specific information pulled into the AVS if appropriate.

## 2022-12-22 DIAGNOSIS — E78.2 MIXED HYPERLIPIDEMIA: ICD-10-CM

## 2022-12-22 RX ORDER — SIMVASTATIN 10 MG
TABLET ORAL
Qty: 90 TABLET | Refills: 0 | Status: SHIPPED | OUTPATIENT
Start: 2022-12-22 | End: 2023-02-27 | Stop reason: SDUPTHER

## 2023-01-05 DIAGNOSIS — J45.909 ASTHMA, UNSPECIFIED ASTHMA SEVERITY, UNSPECIFIED WHETHER COMPLICATED, UNSPECIFIED WHETHER PERSISTENT: ICD-10-CM

## 2023-01-05 RX ORDER — ALBUTEROL SULFATE 90 UG/1
2 AEROSOL, METERED RESPIRATORY (INHALATION) EVERY 6 HOURS PRN
Qty: 18 G | Refills: 1 | Status: SHIPPED | OUTPATIENT
Start: 2023-01-05

## 2023-01-26 DIAGNOSIS — M81.0 OSTEOPOROSIS, UNSPECIFIED OSTEOPOROSIS TYPE, UNSPECIFIED PATHOLOGICAL FRACTURE PRESENCE: ICD-10-CM

## 2023-01-26 RX ORDER — ALENDRONATE SODIUM 70 MG/1
TABLET ORAL
Qty: 12 TABLET | Refills: 0 | Status: SHIPPED | OUTPATIENT
Start: 2023-01-26 | End: 2023-02-27

## 2023-02-27 ENCOUNTER — TELEMEDICINE (OUTPATIENT)
Dept: FAMILY MEDICINE CLINIC | Facility: CLINIC | Age: 68
End: 2023-02-27
Payer: MEDICARE

## 2023-02-27 VITALS — HEIGHT: 65 IN | BODY MASS INDEX: 25.99 KG/M2 | WEIGHT: 156 LBS

## 2023-02-27 DIAGNOSIS — F41.9 ANXIETY: ICD-10-CM

## 2023-02-27 DIAGNOSIS — E78.2 MIXED HYPERLIPIDEMIA: Primary | ICD-10-CM

## 2023-02-27 DIAGNOSIS — M81.0 OSTEOPOROSIS, UNSPECIFIED OSTEOPOROSIS TYPE, UNSPECIFIED PATHOLOGICAL FRACTURE PRESENCE: ICD-10-CM

## 2023-02-27 DIAGNOSIS — Z12.31 SCREENING MAMMOGRAM FOR BREAST CANCER: ICD-10-CM

## 2023-02-27 PROCEDURE — 99214 OFFICE O/P EST MOD 30 MIN: CPT | Performed by: NURSE PRACTITIONER

## 2023-02-27 RX ORDER — LORAZEPAM 1 MG/1
1 TABLET ORAL EVERY 8 HOURS PRN
Qty: 60 TABLET | Refills: 0 | Status: SHIPPED | OUTPATIENT
Start: 2023-02-27

## 2023-02-27 RX ORDER — SIMVASTATIN 10 MG
10 TABLET ORAL EVERY EVENING
Qty: 90 TABLET | Refills: 1 | Status: SHIPPED | OUTPATIENT
Start: 2023-02-27

## 2023-02-27 NOTE — PROGRESS NOTES
You have chosen to receive care through a telehealth visit.  Do you consent to use a video/audio connection for your medical care today? Yes    Mode of visit: video    Location of patient: [place], [place]    Location of provider: [facility].       Chief Complaint  Hyperlipidemia and Anxiety and osteoporosis    Subjective            Charla Holman presents to [facility]  History of Present Illness  Pt is a 6 mo f/u for anxiety, HLD, and osteoporosis. Pt would like to discuss her Fosamax, she was having increased hip pain and she stopped it and after a few weeks the pain resolved.  She would like to stop taking it for now and reevaluate when she repeats her DEXA in November of this year.    Pt is due mammogram 6/2023,orders placed.     She is doing well on zocor and ativan prn.  She requests a refill today.    Pt is due shingles vaccine. [facility]         Past Medical History:   Diagnosis Date   • Acute cystitis 09/19/2014   • Anxiety    • Asthma 02/21/2014   • Breast cancer (HCC)    • Cancer (HCC)    • Glaucoma    • High cholesterol 02/21/2014   • Hyperlipemia    • Limb swelling    • Microscopic hematuria    • UTI (urinary tract infection)        Allergies   Allergen Reactions   • Norco [Hydrocodone-Acetaminophen] Headache   • Codeine Unknown - Low Severity   • Macrobid [Nitrofurantoin] Unknown - Low Severity        Past Surgical History:   Procedure Laterality Date   • APPENDECTOMY  2006   • BREAST SURGERY  2007   • CHOLECYSTECTOMY  2000   • COLONOSCOPY  01/11/2016    DR. RIVERA GODINEZ   • COLONOSCOPY N/A 8/23/2021    Procedure: COLONOSCOPY WITH POLYPECTOMY;  Surgeon: Ravinder Uriarte MD;  Location: [facility] ENDOSCOPY;  Service: Gastroenterology;  Laterality: N/A;  COLON POLYPS, HEMORRHOIDS   • EYE SURGERY      EYE IMPLANT   • GALLBLADDER SURGERY     • HYSTERECTOMY      PARTIAL   • MASTECTOMY Left    • SUBTOTAL HYSTERECTOMY          Social History      Tobacco Use   • Smoking status: Never   • Smokeless tobacco: Never   Substance Use Topics   • Alcohol use: Not Currently     Alcohol/week: 0.0 standard drinks     Comment: RARELY       Family History   Problem Relation Age of Onset   • Heart disease Mother    • Cancer Mother         Colon   • Colon cancer Mother    • Diabetes Father    • Hypertension Father    • Alcohol abuse Father    • Heart disease Father    • Breast cancer Sister    • Cancer Sister         Breast   • Cancer Daughter         Breast   • Malig Hyperthermia Neg Hx         Current Outpatient Medications on File Prior to Visit   Medication Sig   • albuterol sulfate HFA (Ventolin HFA) 108 (90 Base) MCG/ACT inhaler Inhale 2 puffs Every 6 (Six) Hours As Needed for Wheezing.   • aspirin 81 MG EC tablet aspirin 81 mg oral tablet,delayed release (DR/EC) take 1 tablet (81 mg) by oral route once daily   Active   • bimatoprost (Lumigan) 0.01 % ophthalmic drops 1 drop.   • brimonidine-timolol (Combigan) 0.2-0.5 % ophthalmic solution 1 drop.   • Calcium-Vitamin D-Vitamin K 500-100-40 MG-UNT-MCG chewable tablet Chew.   • coenzyme Q10 100 MG capsule Take 100 mg by mouth Daily.   • dorzolamide (TRUSOPT) 2 % ophthalmic solution    • Rhopressa 0.02 % solution INSTILL ONE DROP INTO BOTH EYES ONCE DAILY AT BEDTIME.   • [DISCONTINUED] alendronate (FOSAMAX) 70 MG tablet TAKE ONE TABLET BY MOUTH ONCE WEEKLY. TAKE WITH A FULL GLASS OF WATER   • [DISCONTINUED] LORazepam (ATIVAN) 1 MG tablet Take 1 tablet by mouth Every 8 (Eight) Hours As Needed for Anxiety.   • [DISCONTINUED] simvastatin (ZOCOR) 10 MG tablet TAKE 1 TABLET BY MOUTH EVERY EVENING FOR CHOLESTEROL   • [DISCONTINUED] Calcium Carbonate 1500 (600 Ca) MG tablet Calcium 600 600 mg (1,500 mg) oral tablet take 1 tablet by oral route daily   Active     No current facility-administered medications on file prior to visit.       Health Maintenance Due   Topic Date Due   • ZOSTER VACCINE (2 of 3) 08/13/2015  "      Objective     Ht 165.1 cm (65\")   Wt 70.8 kg (156 lb)   BMI 25.96 kg/m²       Physical Exam  Constitutional:       Appearance: Normal appearance. She is normal weight.   Neurological:      Mental Status: She is alert.   Psychiatric:         Attention and Perception: Attention normal.         Mood and Affect: Mood normal.         Speech: Speech normal.         Behavior: Behavior normal. Behavior is cooperative.         Thought Content: Thought content normal. Thought content does not include suicidal ideation.         Judgment: Judgment normal.           Result Review :                           Assessment and Plan        Diagnoses and all orders for this visit:    1. Mixed hyperlipidemia (Primary)  Comments:  stable on Zocor 10mg, continue  Orders:  -     simvastatin (ZOCOR) 10 MG tablet; Take 1 tablet by mouth Every Evening. for cholesterol  Dispense: 90 tablet; Refill: 1    2. Anxiety  Comments:  stable on Ativan 1mg prn, continue  Orders:  -     LORazepam (ATIVAN) 1 MG tablet; Take 1 tablet by mouth Every 8 (Eight) Hours As Needed for Anxiety.  Dispense: 60 tablet; Refill: 0    3. Screening mammogram for breast cancer  -     Mammo Screening Digital Tomosynthesis Bilateral With CAD; Future    4. Osteoporosis, unspecified osteoporosis type, unspecified pathological fracture presence  Comments:  will repeat DEXA in November 2023, will stop Fosamax due to bone pain              Follow Up     Return in about 6 months (around 8/27/2023).    Patient was given instructions and counseling regarding her condition or for health maintenance advice. Please see specific information pulled into the AVS if appropriate.     Charla Holman  reports that she has never smoked. She has never used smokeless tobacco..  "

## 2023-05-03 ENCOUNTER — TELEPHONE (OUTPATIENT)
Dept: FAMILY MEDICINE CLINIC | Facility: CLINIC | Age: 68
End: 2023-05-03
Payer: MEDICARE

## 2023-05-03 DIAGNOSIS — R03.0 ELEVATED BLOOD PRESSURE READING: Primary | ICD-10-CM

## 2023-05-03 RX ORDER — LISINOPRIL AND HYDROCHLOROTHIAZIDE 12.5; 1 MG/1; MG/1
1 TABLET ORAL DAILY
Qty: 30 TABLET | Refills: 0 | Status: SHIPPED | OUTPATIENT
Start: 2023-05-03

## 2023-05-03 NOTE — TELEPHONE ENCOUNTER
Pt states that her BP has been running high the last 3 days 150/80's-90's. And she can feel it as well she having headaches.      Pt has been dealing with a lot stress in the last 4 months.      Pt states this has had in the past around Jan 2021 but BP ran higher then. Pt took Lisinopril-HTCZ 10-12.5mg QD, which this helped. But stopped medication after a month d/t causing BP to drop low.        She wanted to know if she needed to do this again to help. Pt had an old prescribe. Pt was advised not to take d/t being old.      Please Advise.

## 2023-05-10 ENCOUNTER — OFFICE VISIT (OUTPATIENT)
Dept: FAMILY MEDICINE CLINIC | Facility: CLINIC | Age: 68
End: 2023-05-10
Payer: MEDICARE

## 2023-05-10 VITALS
SYSTOLIC BLOOD PRESSURE: 129 MMHG | HEART RATE: 68 BPM | WEIGHT: 158 LBS | OXYGEN SATURATION: 98 % | DIASTOLIC BLOOD PRESSURE: 50 MMHG | BODY MASS INDEX: 26.33 KG/M2 | HEIGHT: 65 IN

## 2023-05-10 DIAGNOSIS — I10 PRIMARY HYPERTENSION: Primary | ICD-10-CM

## 2023-05-10 PROCEDURE — 1159F MED LIST DOCD IN RCRD: CPT | Performed by: NURSE PRACTITIONER

## 2023-05-10 PROCEDURE — 99213 OFFICE O/P EST LOW 20 MIN: CPT | Performed by: NURSE PRACTITIONER

## 2023-05-10 PROCEDURE — 1160F RVW MEDS BY RX/DR IN RCRD: CPT | Performed by: NURSE PRACTITIONER

## 2023-05-10 NOTE — PROGRESS NOTES
Chief Complaint  Hypertension    Subjective            Charla Holman presents to [facility]  History of Present Illness  Pt her for f/u HTN.  A week ago she noticed her BP was going up she restarted her zestoretic last week.  She reports she has only been taking 1/2 tab but her bp is running very good 1202/80s.  She has no other concerns.    Pt states that she has been taking Zestoreic 10-12.5 mg 1/2 tablet a day.    Pt brought in BP log.        Past Medical History:   Diagnosis Date   • Acute cystitis 09/19/2014   • Anxiety    • Asthma 02/21/2014   • Breast cancer    • Cancer    • Glaucoma    • High cholesterol 02/21/2014   • Hyperlipemia    • Limb swelling    • Microscopic hematuria    • UTI (urinary tract infection)        Allergies   Allergen Reactions   • Norco [Hydrocodone-Acetaminophen] Headache   • Codeine Unknown - Low Severity   • Macrobid [Nitrofurantoin] Unknown - Low Severity        Past Surgical History:   Procedure Laterality Date   • APPENDECTOMY  2006   • BREAST SURGERY  2007   • CHOLECYSTECTOMY  2000   • COLONOSCOPY  01/11/2016    DR. RIVERA GODINEZ   • COLONOSCOPY N/A 8/23/2021    Procedure: COLONOSCOPY WITH POLYPECTOMY;  Surgeon: Ravinder Uriarte MD;  Location: [facility] ENDOSCOPY;  Service: Gastroenterology;  Laterality: N/A;  COLON POLYPS, HEMORRHOIDS   • EYE SURGERY      EYE IMPLANT   • GALLBLADDER SURGERY     • HYSTERECTOMY      PARTIAL   • MASTECTOMY Left    • SUBTOTAL HYSTERECTOMY          Social History     Tobacco Use   • Smoking status: Never   • Smokeless tobacco: Never   Substance Use Topics   • Alcohol use: Not Currently     Comment: RARELY       Family History   Problem Relation Age of Onset   • Heart disease Mother    • Cancer Mother         Colon   • Colon cancer Mother    • Diabetes Father    • Hypertension Father    • Alcohol abuse Father    • Heart disease Father    • Breast cancer Sister    • Cancer Sister         Breast   • Cancer Daughter   "       Breast   • Malig Hyperthermia Neg Hx         Current Outpatient Medications on File Prior to Visit   Medication Sig   • albuterol sulfate HFA (Ventolin HFA) 108 (90 Base) MCG/ACT inhaler Inhale 2 puffs Every 6 (Six) Hours As Needed for Wheezing.   • aspirin 81 MG EC tablet aspirin 81 mg oral tablet,delayed release (DR/EC) take 1 tablet (81 mg) by oral route once daily   Active   • bimatoprost (Lumigan) 0.01 % ophthalmic drops 1 drop.   • brimonidine-timolol (Combigan) 0.2-0.5 % ophthalmic solution 1 drop.   • Calcium-Vitamin D-Vitamin K 500-100-40 MG-UNT-MCG chewable tablet Chew.   • coenzyme Q10 100 MG capsule Take 1 capsule by mouth Daily.   • dorzolamide (TRUSOPT) 2 % ophthalmic solution    • lisinopril-hydrochlorothiazide (Zestoretic) 10-12.5 MG per tablet Take 1 tablet by mouth Daily. (Patient taking differently: Take 0.5 tablets by mouth Daily.)   • LORazepam (ATIVAN) 1 MG tablet Take 1 tablet by mouth Every 8 (Eight) Hours As Needed for Anxiety.   • Rhopressa 0.02 % solution INSTILL ONE DROP INTO BOTH EYES ONCE DAILY AT BEDTIME.   • simvastatin (ZOCOR) 10 MG tablet Take 1 tablet by mouth Every Evening. for cholesterol     No current facility-administered medications on file prior to visit.       There are no preventive care reminders to display for this patient.    Objective     /50 (BP Location: Right arm, Patient Position: Sitting, Cuff Size: Adult)   Pulse 68   Ht 165.1 cm (65\")   Wt 71.7 kg (158 lb)   SpO2 98%   BMI 26.29 kg/m²       Physical Exam  Constitutional:       General: She is not in acute distress.     Appearance: Normal appearance. She is not ill-appearing.   HENT:      Head: Normocephalic and atraumatic.   Cardiovascular:      Rate and Rhythm: Normal rate and regular rhythm.      Heart sounds: Normal heart sounds. No murmur heard.  Pulmonary:      Effort: Pulmonary effort is normal. No respiratory distress.      Breath sounds: Normal breath sounds.   Chest:      Chest wall: " No tenderness.   Abdominal:      General: There is no distension.      Palpations: There is no mass.      Tenderness: There is no abdominal tenderness. There is no guarding.   Musculoskeletal:         General: No swelling or tenderness. Normal range of motion.      Cervical back: Normal range of motion and neck supple.   Skin:     General: Skin is warm and dry.      Findings: No rash.   Neurological:      General: No focal deficit present.      Mental Status: She is alert and oriented to person, place, and time. Mental status is at baseline.      Gait: Gait normal.   Psychiatric:         Mood and Affect: Mood normal.         Behavior: Behavior normal.         Thought Content: Thought content normal.         Judgment: Judgment normal.           Result Review :                           Assessment and Plan        Diagnoses and all orders for this visit:    1. Primary hypertension (Primary)  Comments:  stable on zestoretic 10/12.5mg (1/2 tab daily) continue              Follow Up     Return in about 6 months (around 11/10/2023).    Patient was given instructions and counseling regarding her condition or for health maintenance advice. Please see specific information pulled into the AVS if appropriate.     Charla Holman  reports that she has never smoked. She has never used smokeless tobacco..       SRIRAM Laboy

## 2023-06-06 DIAGNOSIS — R03.0 ELEVATED BLOOD PRESSURE READING: ICD-10-CM

## 2023-06-06 RX ORDER — LISINOPRIL AND HYDROCHLOROTHIAZIDE 12.5; 1 MG/1; MG/1
0.5 TABLET ORAL DAILY
Qty: 45 TABLET | Refills: 1 | Status: SHIPPED | OUTPATIENT
Start: 2023-06-06

## 2023-08-24 ENCOUNTER — TELEMEDICINE (OUTPATIENT)
Dept: FAMILY MEDICINE CLINIC | Facility: CLINIC | Age: 68
End: 2023-08-24
Payer: MEDICARE

## 2023-08-24 DIAGNOSIS — F41.9 ANXIETY: Primary | ICD-10-CM

## 2023-08-24 DIAGNOSIS — Z78.0 MENOPAUSE: ICD-10-CM

## 2023-08-24 DIAGNOSIS — I10 PRIMARY HYPERTENSION: ICD-10-CM

## 2023-08-24 DIAGNOSIS — E78.2 MIXED HYPERLIPIDEMIA: ICD-10-CM

## 2023-08-24 PROCEDURE — 1159F MED LIST DOCD IN RCRD: CPT | Performed by: NURSE PRACTITIONER

## 2023-08-24 PROCEDURE — 99214 OFFICE O/P EST MOD 30 MIN: CPT | Performed by: NURSE PRACTITIONER

## 2023-08-24 PROCEDURE — 1160F RVW MEDS BY RX/DR IN RCRD: CPT | Performed by: NURSE PRACTITIONER

## 2023-08-24 RX ORDER — LORAZEPAM 1 MG/1
1 TABLET ORAL EVERY 8 HOURS PRN
Qty: 60 TABLET | Refills: 0 | Status: SHIPPED | OUTPATIENT
Start: 2023-08-24

## 2023-08-24 RX ORDER — SIMVASTATIN 10 MG
10 TABLET ORAL EVERY EVENING
Qty: 90 TABLET | Refills: 1 | Status: SHIPPED | OUTPATIENT
Start: 2023-08-24

## 2023-08-24 NOTE — PROGRESS NOTES
You have chosen to receive care through a telehealth visit.  Do you consent to use a video/audio connection for your medical care today? Yes    Mode of visit: video     Location of patient: [place], [place]    Location of provider: [facility]     Chief Complaint  Anxiety, Hyperlipidemia, and Hypertension    Subjective            Charla Holman presents to [facility]  History of Present Illness  Pt is a f/u for anxiety, HLD, and HTN. No issues or concerns at this time.     Pt is due Dexa.     Pt will come to office to update UDS and CSA.      Past Medical History:   Diagnosis Date    Acute cystitis 09/19/2014    Anxiety     Asthma 02/21/2014    Breast cancer     Cancer     Glaucoma     High cholesterol 02/21/2014    Hyperlipemia     Limb swelling     Microscopic hematuria     UTI (urinary tract infection)        Allergies   Allergen Reactions    Norco [Hydrocodone-Acetaminophen] Headache    Codeine Unknown - Low Severity    Macrobid [Nitrofurantoin] Unknown - Low Severity        Past Surgical History:   Procedure Laterality Date    APPENDECTOMY  2006    BREAST SURGERY  2007    CHOLECYSTECTOMY  2000    COLONOSCOPY  01/11/2016    DR. RIVERA GODINEZ    COLONOSCOPY N/A 8/23/2021    Procedure: COLONOSCOPY WITH POLYPECTOMY;  Surgeon: Ravinder Uriarte MD;  Location: [facility] ENDOSCOPY;  Service: Gastroenterology;  Laterality: N/A;  COLON POLYPS, HEMORRHOIDS    EYE SURGERY      EYE IMPLANT    GALLBLADDER SURGERY      HYSTERECTOMY      PARTIAL    MASTECTOMY Left     SUBTOTAL HYSTERECTOMY          Social History     Tobacco Use    Smoking status: Never    Smokeless tobacco: Never   Substance Use Topics    Alcohol use: Not Currently     Comment: RARELY       Family History   Problem Relation Age of Onset    Heart disease Mother     Cancer Mother         Colon    Colon cancer Mother     Diabetes Father     Hypertension Father     Alcohol abuse Father     Heart disease Father     Breast  cancer Sister     Cancer Sister         Breast    Cancer Daughter         Breast    Malig Hyperthermia Neg Hx         Current Outpatient Medications on File Prior to Visit   Medication Sig    albuterol sulfate HFA (Ventolin HFA) 108 (90 Base) MCG/ACT inhaler Inhale 2 puffs Every 6 (Six) Hours As Needed for Wheezing.    aspirin 81 MG EC tablet aspirin 81 mg oral tablet,delayed release (DR/EC) take 1 tablet (81 mg) by oral route once daily   Active    bimatoprost (Lumigan) 0.01 % ophthalmic drops 1 drop.    brimonidine-timolol (Combigan) 0.2-0.5 % ophthalmic solution 1 drop.    Calcium-Vitamin D-Vitamin K 500-100-40 MG-UNT-MCG chewable tablet Chew.    coenzyme Q10 100 MG capsule Take 1 capsule by mouth Daily.    dorzolamide (TRUSOPT) 2 % ophthalmic solution     lisinopril-hydrochlorothiazide (Zestoretic) 10-12.5 MG per tablet Take 0.5 tablets by mouth Daily.    Rhopressa 0.02 % solution INSTILL ONE DROP INTO BOTH EYES ONCE DAILY AT BEDTIME.    [DISCONTINUED] LORazepam (ATIVAN) 1 MG tablet Take 1 tablet by mouth Every 8 (Eight) Hours As Needed for Anxiety.    [DISCONTINUED] simvastatin (ZOCOR) 10 MG tablet Take 1 tablet by mouth Every Evening. for cholesterol     No current facility-administered medications on file prior to visit.       There are no preventive care reminders to display for this patient.    Objective     There were no vitals taken for this visit.      Physical Exam  Constitutional:       Appearance: Normal appearance.   Neurological:      Mental Status: She is alert.   Psychiatric:         Attention and Perception: Attention normal.         Mood and Affect: Mood and affect normal.         Speech: Speech normal.         Behavior: Behavior normal. Behavior is cooperative.         Thought Content: Thought content normal. Thought content does not include suicidal ideation.         Judgment: Judgment normal.         Result Review :                           Assessment and Plan        Diagnoses and all orders  for this visit:    1. Anxiety (Primary)  Comments:  stable on Ativan 1mg prn, continue, pt will stop by office to update UDS  Orders:  -     LORazepam (ATIVAN) 1 MG tablet; Take 1 tablet by mouth Every 8 (Eight) Hours As Needed for Anxiety.  Dispense: 60 tablet; Refill: 0    2. Mixed hyperlipidemia  Comments:  stable on Zocor 10mg, continue  Orders:  -     simvastatin (ZOCOR) 10 MG tablet; Take 1 tablet by mouth Every Evening. for cholesterol  Dispense: 90 tablet; Refill: 1    3. Primary hypertension  Comments:  stable on zestoretic 10/12.5mg, continue    4. Menopause  -     DEXA Bone Density Axial              Follow Up     Return in about 6 months (around 2/24/2024).    Patient was given instructions and counseling regarding her condition or for health maintenance advice. Please see specific information pulled into the AVS if appropriate.     Charla Holman  reports that she has never smoked. She has never used smokeless tobacco..

## 2023-09-05 ENCOUNTER — LAB (OUTPATIENT)
Dept: FAMILY MEDICINE CLINIC | Facility: CLINIC | Age: 68
End: 2023-09-05
Payer: MEDICARE

## 2023-09-05 DIAGNOSIS — Z79.899 MEDICATION MANAGEMENT: Primary | ICD-10-CM

## 2023-09-05 LAB
AMPHET+METHAMPHET UR QL: NEGATIVE
AMPHETAMINE INTERNAL CONTROL: ABNORMAL
AMPHETAMINES UR QL: NEGATIVE
BARBITURATE INTERNAL CONTROL: ABNORMAL
BARBITURATES UR QL SCN: POSITIVE
BENZODIAZ UR QL SCN: POSITIVE
BENZODIAZEPINE INTERNAL CONTROL: ABNORMAL
BUPRENORPHINE INTERNAL CONTROL: ABNORMAL
BUPRENORPHINE SERPL-MCNC: NEGATIVE NG/ML
CANNABINOIDS SERPL QL: NEGATIVE
COCAINE INTERNAL CONTROL: ABNORMAL
COCAINE UR QL: NEGATIVE
EXPIRATION DATE: ABNORMAL
Lab: ABNORMAL
MDMA (ECSTASY) INTERNAL CONTROL: ABNORMAL
MDMA UR QL SCN: NEGATIVE
METHADONE INTERNAL CONTROL: ABNORMAL
METHADONE UR QL SCN: NEGATIVE
METHAMPHETAMINE INTERNAL CONTROL: ABNORMAL
OPIATES INTERNAL CONTROL: ABNORMAL
OPIATES UR QL: NEGATIVE
OXYCODONE INTERNAL CONTROL: ABNORMAL
OXYCODONE UR QL SCN: NEGATIVE
PCP UR QL SCN: NEGATIVE
PHENCYCLIDINE INTERNAL CONTROL: ABNORMAL
THC INTERNAL CONTROL: ABNORMAL

## 2023-09-05 PROCEDURE — 80305 DRUG TEST PRSMV DIR OPT OBS: CPT | Performed by: NURSE PRACTITIONER

## 2023-09-25 ENCOUNTER — TELEMEDICINE (OUTPATIENT)
Dept: FAMILY MEDICINE CLINIC | Facility: CLINIC | Age: 68
End: 2023-09-25

## 2023-09-25 DIAGNOSIS — Z00.00 MEDICARE ANNUAL WELLNESS VISIT, SUBSEQUENT: Primary | ICD-10-CM

## 2023-09-25 DIAGNOSIS — E78.5 HYPERLIPIDEMIA, UNSPECIFIED HYPERLIPIDEMIA TYPE: ICD-10-CM

## 2023-09-25 DIAGNOSIS — I10 PRIMARY HYPERTENSION: ICD-10-CM

## 2023-09-25 DIAGNOSIS — Z13.29 SCREENING FOR THYROID DISORDER: ICD-10-CM

## 2023-09-25 PROCEDURE — G0439 PPPS, SUBSEQ VISIT: HCPCS | Performed by: NURSE PRACTITIONER

## 2023-09-25 PROCEDURE — 1170F FXNL STATUS ASSESSED: CPT | Performed by: NURSE PRACTITIONER

## 2023-09-25 PROCEDURE — 1159F MED LIST DOCD IN RCRD: CPT | Performed by: NURSE PRACTITIONER

## 2023-09-25 PROCEDURE — 1160F RVW MEDS BY RX/DR IN RCRD: CPT | Performed by: NURSE PRACTITIONER

## 2023-09-25 NOTE — PROGRESS NOTES
You have chosen to receive care through a telehealth visit.  Do you consent to use a video/audio connection for your medical care today? Yes    Mode of visit: video    Location of patient: [place], [place]    Location of provider: [facility]       The ABCs of the Annual Wellness Visit  Subsequent Medicare Wellness Visit    Subjective      Charla Holman is a 68 y.o. female who presents for a Subsequent Medicare Wellness Visit.    The following portions of the patient's history were reviewed and   updated as appropriate: allergies, current medications, past family history, past medical history, past social history, past surgical history, and problem list.    Compared to one year ago, the patient feels her physical   health is the same.    Compared to one year ago, the patient feels her mental   health is the same.    Recent Hospitalizations:  She was not admitted to the hospital during the last year.       Current Medical Providers:  Patient Care Team:  Marylou Hodgson APRN as PCP - General (Nurse Practitioner)    Outpatient Medications Prior to Visit   Medication Sig Dispense Refill    albuterol sulfate HFA (Ventolin HFA) 108 (90 Base) MCG/ACT inhaler Inhale 2 puffs Every 6 (Six) Hours As Needed for Wheezing. 18 g 1    aspirin 81 MG EC tablet aspirin 81 mg oral tablet,delayed release (DR/EC) take 1 tablet (81 mg) by oral route once daily   Active      bimatoprost (Lumigan) 0.01 % ophthalmic drops 1 drop.      brimonidine-timolol (Combigan) 0.2-0.5 % ophthalmic solution 1 drop.      coenzyme Q10 100 MG capsule Take 1 capsule by mouth Daily.      dorzolamide (TRUSOPT) 2 % ophthalmic solution       lisinopril-hydrochlorothiazide (Zestoretic) 10-12.5 MG per tablet Take 0.5 tablets by mouth Daily. 45 tablet 1    LORazepam (ATIVAN) 1 MG tablet Take 1 tablet by mouth Every 8 (Eight) Hours As Needed for Anxiety. 60 tablet 0    Rhopressa 0.02 % solution INSTILL ONE DROP INTO BOTH EYES ONCE DAILY AT BEDTIME.       "simvastatin (ZOCOR) 10 MG tablet Take 1 tablet by mouth Every Evening. for cholesterol 90 tablet 1    Calcium-Vitamin D-Vitamin K 500-100-40 MG-UNT-MCG chewable tablet Chew. (Patient not taking: Reported on 9/25/2023)       No facility-administered medications prior to visit.       No opioid medication identified on active medication list. I have reviewed chart for other potential  high risk medication/s and harmful drug interactions in the elderly.        Aspirin is on active medication list. Aspirin use is indicated based on review of current medical condition/s. Pros and cons of this therapy have been discussed today. Benefits of this medication outweigh potential harm.  Patient has been encouraged to continue taking this medication.  .      Patient Active Problem List   Diagnosis    Acute cystitis    Anxiety    Asthma    Cancer    Mixed hyperlipidemia    Malignant neoplasm of breast (female)     Advance Care Planning   Advance Care Planning     Advance Directive is not on file.  ACP discussion was held with the patient during this visit. Patient has an advance directive (not in EMR), copy requested.     Objective    Vitals:    09/25/23 1013   PainSc: 0-No pain     Estimated body mass index is 26.29 kg/m² as calculated from the following:    Height as of 5/10/23: 165.1 cm (65\").    Weight as of 5/10/23: 71.7 kg (158 lb).    BMI is >= 25 and <30. (Overweight) The following options were offered after discussion;: exercise counseling/recommendations and nutrition counseling/recommendations      Does the patient have evidence of cognitive impairment?   No            HEALTH RISK ASSESSMENT    Smoking Status:  Social History     Tobacco Use   Smoking Status Never   Smokeless Tobacco Never     Alcohol Consumption:  Social History     Substance and Sexual Activity   Alcohol Use Not Currently    Comment: RARELY     Fall Risk Screen:    STEADI Fall Risk Assessment was completed, and patient is at LOW risk for " falls.Assessment completed on:2023    Depression Screenin/25/2023    10:00 AM   PHQ-2/PHQ-9 Depression Screening   Little Interest or Pleasure in Doing Things 0-->not at all   Feeling Down, Depressed or Hopeless 0-->not at all   PHQ-9: Brief Depression Severity Measure Score 0       Health Habits and Functional and Cognitive Screenin/25/2023    10:00 AM   Functional & Cognitive Status   Do you have difficulty preparing food and eating? No   Do you have difficulty bathing yourself, getting dressed or grooming yourself? No   Do you have difficulty using the toilet? No   Do you have difficulty moving around from place to place? No   Do you have trouble with steps or getting out of a bed or a chair? No   Current Diet Well Balanced Diet   Dental Exam Up to date   Eye Exam Up to date   Exercise (times per week) 5 times per week   Current Exercises Include House Cleaning;Walking;Yard Work   Do you need help using the phone?  No   Are you deaf or do you have serious difficulty hearing?  No   Do you need help to go to places out of walking distance? No   Do you need help shopping? No   Do you need help preparing meals?  No   Do you need help with housework?  No   Do you need help with laundry? No   Do you need help taking your medications? No   Do you need help managing money? No   Do you ever drive or ride in a car without wearing a seat belt? No   Have you felt unusual stress, anger or loneliness in the last month? No   Who do you live with? Spouse   If you need help, do you have trouble finding someone available to you? No   Have you been bothered in the last four weeks by sexual problems? No   Do you have difficulty concentrating, remembering or making decisions? No       Age-appropriate Screening Schedule:  Refer to the list below for future screening recommendations based on patient's age, sex and/or medical conditions. Orders for these recommended tests are listed in the plan section. The  patient has been provided with a written plan.    Health Maintenance   Topic Date Due    BMI FOLLOWUP  Never done    LIPID PANEL  09/08/2023    ZOSTER VACCINE (2 of 3) 05/10/2024 (Originally 8/13/2015)    INFLUENZA VACCINE  10/01/2023    DXA SCAN  11/30/2023    MAMMOGRAM  06/22/2024    ANNUAL WELLNESS VISIT  09/25/2024    TDAP/TD VACCINES (3 - Td or Tdap) 05/17/2026    COLORECTAL CANCER SCREENING  08/23/2026    HEPATITIS C SCREENING  Completed    COVID-19 Vaccine  Completed    Pneumococcal Vaccine 65+  Completed                  CMS Preventative Services Quick Reference  Risk Factors Identified During Encounter:    None Identified    The above risks/problems have been discussed with the patient.  Pertinent information has been shared with the patient in the After Visit Summary.    Diagnoses and all orders for this visit:    1. Medicare annual wellness visit, subsequent (Primary)    2. Primary hypertension  -     CBC w AUTO Differential; Future  -     Comprehensive metabolic panel; Future    3. Hyperlipidemia, unspecified hyperlipidemia type  -     Lipid panel; Future    4. Screening for thyroid disorder  -     TSH; Future        Follow Up:   Next Medicare Wellness visit to be scheduled in 1 year.      An After Visit Summary and PPPS were made available to the patient.

## 2023-09-26 ENCOUNTER — LAB (OUTPATIENT)
Dept: LAB | Facility: HOSPITAL | Age: 68
End: 2023-09-26
Payer: MEDICARE

## 2023-09-26 DIAGNOSIS — I10 PRIMARY HYPERTENSION: ICD-10-CM

## 2023-09-26 DIAGNOSIS — E78.5 HYPERLIPIDEMIA, UNSPECIFIED HYPERLIPIDEMIA TYPE: ICD-10-CM

## 2023-09-26 DIAGNOSIS — Z13.29 SCREENING FOR THYROID DISORDER: ICD-10-CM

## 2023-09-26 LAB
ALBUMIN SERPL-MCNC: 4.1 G/DL (ref 3.5–5.2)
ALBUMIN/GLOB SERPL: 1.6 G/DL
ALP SERPL-CCNC: 63 U/L (ref 39–117)
ALT SERPL W P-5'-P-CCNC: 20 U/L (ref 1–33)
ANION GAP SERPL CALCULATED.3IONS-SCNC: 9.6 MMOL/L (ref 5–15)
AST SERPL-CCNC: 24 U/L (ref 1–32)
BASOPHILS # BLD AUTO: 0.03 10*3/MM3 (ref 0–0.2)
BASOPHILS NFR BLD AUTO: 0.6 % (ref 0–1.5)
BILIRUB SERPL-MCNC: 0.6 MG/DL (ref 0–1.2)
BUN SERPL-MCNC: 19 MG/DL (ref 8–23)
BUN/CREAT SERPL: 21.3 (ref 7–25)
CALCIUM SPEC-SCNC: 9.7 MG/DL (ref 8.6–10.5)
CHLORIDE SERPL-SCNC: 104 MMOL/L (ref 98–107)
CHOLEST SERPL-MCNC: 175 MG/DL (ref 0–200)
CO2 SERPL-SCNC: 25.4 MMOL/L (ref 22–29)
CREAT SERPL-MCNC: 0.89 MG/DL (ref 0.57–1)
DEPRECATED RDW RBC AUTO: 46.3 FL (ref 37–54)
EGFRCR SERPLBLD CKD-EPI 2021: 70.3 ML/MIN/1.73
EOSINOPHIL # BLD AUTO: 0.36 10*3/MM3 (ref 0–0.4)
EOSINOPHIL NFR BLD AUTO: 6.9 % (ref 0.3–6.2)
ERYTHROCYTE [DISTWIDTH] IN BLOOD BY AUTOMATED COUNT: 13 % (ref 12.3–15.4)
GLOBULIN UR ELPH-MCNC: 2.6 GM/DL
GLUCOSE SERPL-MCNC: 87 MG/DL (ref 65–99)
HCT VFR BLD AUTO: 38.1 % (ref 34–46.6)
HDLC SERPL-MCNC: 59 MG/DL (ref 40–60)
HGB BLD-MCNC: 12.7 G/DL (ref 12–15.9)
IMM GRANULOCYTES # BLD AUTO: 0 10*3/MM3 (ref 0–0.05)
IMM GRANULOCYTES NFR BLD AUTO: 0 % (ref 0–0.5)
LDLC SERPL CALC-MCNC: 101 MG/DL (ref 0–100)
LDLC/HDLC SERPL: 1.7 {RATIO}
LYMPHOCYTES # BLD AUTO: 1.43 10*3/MM3 (ref 0.7–3.1)
LYMPHOCYTES NFR BLD AUTO: 27.6 % (ref 19.6–45.3)
MCH RBC QN AUTO: 32.3 PG (ref 26.6–33)
MCHC RBC AUTO-ENTMCNC: 33.3 G/DL (ref 31.5–35.7)
MCV RBC AUTO: 96.9 FL (ref 79–97)
MONOCYTES # BLD AUTO: 0.48 10*3/MM3 (ref 0.1–0.9)
MONOCYTES NFR BLD AUTO: 9.2 % (ref 5–12)
NEUTROPHILS NFR BLD AUTO: 2.89 10*3/MM3 (ref 1.7–7)
NEUTROPHILS NFR BLD AUTO: 55.7 % (ref 42.7–76)
NRBC BLD AUTO-RTO: 0 /100 WBC (ref 0–0.2)
PLATELET # BLD AUTO: 253 10*3/MM3 (ref 140–450)
PMV BLD AUTO: 11 FL (ref 6–12)
POTASSIUM SERPL-SCNC: 4.7 MMOL/L (ref 3.5–5.2)
PROT SERPL-MCNC: 6.7 G/DL (ref 6–8.5)
RBC # BLD AUTO: 3.93 10*6/MM3 (ref 3.77–5.28)
SODIUM SERPL-SCNC: 139 MMOL/L (ref 136–145)
TRIGL SERPL-MCNC: 79 MG/DL (ref 0–150)
TSH SERPL DL<=0.05 MIU/L-ACNC: 2.57 UIU/ML (ref 0.27–4.2)
VLDLC SERPL-MCNC: 15 MG/DL (ref 5–40)
WBC NRBC COR # BLD: 5.19 10*3/MM3 (ref 3.4–10.8)

## 2023-09-26 PROCEDURE — 80053 COMPREHEN METABOLIC PANEL: CPT

## 2023-09-26 PROCEDURE — 36415 COLL VENOUS BLD VENIPUNCTURE: CPT

## 2023-09-26 PROCEDURE — 84443 ASSAY THYROID STIM HORMONE: CPT

## 2023-09-26 PROCEDURE — 80061 LIPID PANEL: CPT

## 2023-09-26 PROCEDURE — 85025 COMPLETE CBC W/AUTO DIFF WBC: CPT

## 2023-12-09 DIAGNOSIS — R03.0 ELEVATED BLOOD PRESSURE READING: ICD-10-CM

## 2023-12-11 RX ORDER — LISINOPRIL AND HYDROCHLOROTHIAZIDE 12.5; 1 MG/1; MG/1
TABLET ORAL
Qty: 45 TABLET | Refills: 1 | Status: SHIPPED | OUTPATIENT
Start: 2023-12-11

## 2023-12-14 ENCOUNTER — HOSPITAL ENCOUNTER (OUTPATIENT)
Dept: BONE DENSITY | Facility: HOSPITAL | Age: 68
Discharge: HOME | End: 2023-12-14
Admitting: NURSE PRACTITIONER
Payer: MEDICARE

## 2023-12-14 PROCEDURE — 77080 DXA BONE DENSITY AXIAL: CPT

## 2024-01-03 DIAGNOSIS — F41.9 ANXIETY: ICD-10-CM

## 2024-01-03 NOTE — TELEPHONE ENCOUNTER
Controlled Medication Refill Request:    1.  Last Office Visit:  9/25/2023     2.  Next Office Visit:  2/26/2024     3.  Last UDS Date:  9/5/23    4.  Last Consent Signed:  9/5/23    5.  Medication Requested: Ativan  (Lorazepam)    LRX: 8/24/23 #60 [id number] RF     Pharmacy:   [place], [place] - [address] [place] [address] [phone]  - [phone] [address] [address]  Phone: [phone] Fax: [phone]

## 2024-01-04 RX ORDER — LORAZEPAM 1 MG/1
1 TABLET ORAL EVERY 8 HOURS PRN
Qty: 60 TABLET | Refills: 0 | Status: SHIPPED | OUTPATIENT
Start: 2024-01-04

## 2024-01-12 ENCOUNTER — TELEPHONE (OUTPATIENT)
Dept: GASTROENTEROLOGY | Facility: CLINIC | Age: 69
End: 2024-01-12

## 2024-01-30 ENCOUNTER — OFFICE VISIT (OUTPATIENT)
Dept: GASTROENTEROLOGY | Facility: CLINIC | Age: 69
End: 2024-01-30
Payer: MEDICARE

## 2024-01-30 VITALS
HEART RATE: 80 BPM | DIASTOLIC BLOOD PRESSURE: 58 MMHG | SYSTOLIC BLOOD PRESSURE: 131 MMHG | WEIGHT: 155.8 LBS | OXYGEN SATURATION: 97 % | BODY MASS INDEX: 25.96 KG/M2 | HEIGHT: 65 IN

## 2024-01-30 DIAGNOSIS — Z80.0 FAMILY HISTORY OF COLON CANCER: ICD-10-CM

## 2024-01-30 DIAGNOSIS — R19.4 CHANGE IN BOWEL HABITS: Primary | ICD-10-CM

## 2024-01-30 DIAGNOSIS — Z86.010 HISTORY OF COLON POLYPS: ICD-10-CM

## 2024-01-30 DIAGNOSIS — K59.00 CONSTIPATION, UNSPECIFIED CONSTIPATION TYPE: ICD-10-CM

## 2024-01-30 PROBLEM — Z86.0100 HISTORY OF COLON POLYPS: Status: ACTIVE | Noted: 2024-01-30

## 2024-01-30 RX ORDER — SOD SULF/POT CHLORIDE/MAG SULF 1.479 G
12 TABLET ORAL TAKE AS DIRECTED
Qty: 24 TABLET | Refills: 0 | Status: SHIPPED | OUTPATIENT
Start: 2024-01-30

## 2024-01-30 NOTE — PROGRESS NOTES
Chief Complaint  Constipation, Diarrhea, and Abdominal Pain (- Cramping )    Charla Holman is a 68 y.o. female who presents to [facility] [place] for change in bowel habits.    History of present Illness  Patient presents to the office for change in bowel habits, history of polpys, and family history of colon cancer. Bowel habits changed to more constipation about 3 months ago. Constipation relieved when taking daily fiber supplement. Denies starting any new medication during this time frame. Denies abdominal pain, melena, and hematochezia. Denies unintentional weight loss. Denies upper GI symptoms.     Colonoscopy 8/23/2021 by Dr. Uriarte-small polyp in the ascending colon, small polyp in the transverse colon, and grade 1 internal hemorrhoids. Polyps - tubular adenoma. Repeat colonoscopy in 5 years.    Past Medical History:   Diagnosis Date    Acute cystitis 09/19/2014    Anxiety     Asthma 02/21/2014    Breast cancer     Cancer     Glaucoma     High cholesterol 02/21/2014    Hyperlipemia     Limb swelling     Microscopic hematuria     UTI (urinary tract infection)        Past Surgical History:   Procedure Laterality Date    APPENDECTOMY  2006    BREAST SURGERY  2007    CHOLECYSTECTOMY  2000    COLONOSCOPY  01/11/2016    DR. RIVERA GODINEZ    COLONOSCOPY N/A 8/23/2021    Procedure: COLONOSCOPY WITH POLYPECTOMY;  Surgeon: Ravinder Uriarte MD;  Location: [facility] ENDOSCOPY;  Service: Gastroenterology;  Laterality: N/A;  COLON POLYPS, HEMORRHOIDS    EYE SURGERY      EYE IMPLANT    GALLBLADDER SURGERY      HYSTERECTOMY      PARTIAL    MASTECTOMY Left     SUBTOTAL HYSTERECTOMY           Current Outpatient Medications:     albuterol sulfate HFA (Ventolin HFA) 108 (90 Base) MCG/ACT inhaler, Inhale 2 puffs Every 6 (Six) Hours As Needed for Wheezing., Disp: 18 g, Rfl: 1    aspirin 81 MG EC tablet, aspirin 81 mg oral tablet,delayed release (DR/EC) take 1 tablet (81 mg) by oral route  "once daily   Active, Disp: , Rfl:     bimatoprost (Lumigan) 0.01 % ophthalmic drops, 1 drop., Disp: , Rfl:     brimonidine-timolol (Combigan) 0.2-0.5 % ophthalmic solution, 1 drop., Disp: , Rfl:     coenzyme Q10 100 MG capsule, Take 1 capsule by mouth Daily., Disp: , Rfl:     dorzolamide (TRUSOPT) 2 % ophthalmic solution, , Disp: , Rfl:     lisinopril-hydrochlorothiazide (PRINZIDE,ZESTORETIC) 10-12.5 MG per tablet, TAKE 1/2 TABLET BY MOUTH ONCE DAILY FOR BLOOD PRESSURE, Disp: 45 tablet, Rfl: 1    LORazepam (ATIVAN) 1 MG tablet, TAKE 1 TABLET BY MOUTH EVERY 8 HOURS AS NEEDED FOR ANXIETY, Disp: 60 tablet, Rfl: 0    Rhopressa 0.02 % solution, INSTILL ONE DROP INTO BOTH EYES ONCE DAILY AT BEDTIME., Disp: , Rfl:     simvastatin (ZOCOR) 10 MG tablet, Take 1 tablet by mouth Every Evening. for cholesterol, Disp: 90 tablet, Rfl: 1    Sodium Sulfate-Mag Sulfate-KCl (Sutab) 5316-192-943 MG tablet, Take 12 tablets by mouth Take As Directed. Take 12 tablets at 6 pm and 12 tablets 4 hours prior to procedure., Disp: 24 tablet, Rfl: 0     Allergies   Allergen Reactions    Norco [Hydrocodone-Acetaminophen] Headache    Codeine Unknown - Low Severity    Macrobid [Nitrofurantoin] Unknown - Low Severity       Family History   Problem Relation Age of Onset    Heart disease Mother     Cancer Mother         Colon    Colon cancer Mother 56    Diabetes Father     Hypertension Father     Alcohol abuse Father     Heart disease Father     Breast cancer Sister     Cancer Sister         Breast    Cancer Daughter         Breast    Malig Hyperthermia Neg Hx         Social History     Social History Narrative    Not on file       Objective       Vital Signs:   /58 (BP Location: Left arm, Patient Position: Sitting, Cuff Size: Adult)   Pulse 80   Ht 165.1 cm (65\")   Wt 70.7 kg (155 lb 12.8 oz)   SpO2 97%   BMI 25.93 kg/m²       Physical Exam  Constitutional:       Appearance: Normal appearance. She is normal weight.   HENT:      Head: " Normocephalic and atraumatic.      Nose: Nose normal.   Pulmonary:      Effort: Pulmonary effort is normal.   Skin:     General: Skin is warm and dry.   Neurological:      Mental Status: She is alert and oriented to person, place, and time. Mental status is at baseline.   Psychiatric:         Mood and Affect: Mood normal.         Behavior: Behavior normal.         Thought Content: Thought content normal.         Judgment: Judgment normal.         Result Review :       CBC w/diff          9/26/2023    08:51   CBC w/Diff   WBC 5.19    RBC 3.93    Hemoglobin 12.7    Hematocrit 38.1    MCV 96.9    MCH 32.3    MCHC 33.3    RDW 13.0    Platelets 253    Neutrophil Rel % 55.7    Immature Granulocyte Rel % 0.0    Lymphocyte Rel % 27.6    Monocyte Rel % 9.2    Eosinophil Rel % 6.9    Basophil Rel % 0.6      CMP          9/26/2023    08:51   CMP   Glucose 87    BUN 19    Creatinine 0.89    EGFR 70.3    Sodium 139    Potassium 4.7    Chloride 104    Calcium 9.7    Total Protein 6.7    Albumin 4.1    Globulin 2.6    Total Bilirubin 0.6    Alkaline Phosphatase 63    AST (SGOT) 24    ALT (SGPT) 20    Albumin/Globulin Ratio 1.6    BUN/Creatinine Ratio 21.3    Anion Gap 9.6                    Assessment and Plan    Diagnoses and all orders for this visit:    1. Change in bowel habits (Primary)  -     Case Request; Standing  -     Follow Anesthesia Guidelines / Protocol; Standing  -     Verify NPO; Standing  -     Verify Bowel Prep Was Successful; Standing  -     Give Tap Water Enema If Bowel Prep Insufficient; Standing  -     Obtain Informed Consent; Standing  -     Case Request    2. Constipation, unspecified constipation type  -     Case Request; Standing  -     Follow Anesthesia Guidelines / Protocol; Standing  -     Verify NPO; Standing  -     Verify Bowel Prep Was Successful; Standing  -     Give Tap Water Enema If Bowel Prep Insufficient; Standing  -     Obtain Informed Consent; Standing  -     Case Request    3. History of  colon polyps  -     Case Request; Standing  -     Follow Anesthesia Guidelines / Protocol; Standing  -     Verify NPO; Standing  -     Verify Bowel Prep Was Successful; Standing  -     Give Tap Water Enema If Bowel Prep Insufficient; Standing  -     Obtain Informed Consent; Standing  -     Case Request    4. Family history of colon cancer  -     Case Request; Standing  -     Follow Anesthesia Guidelines / Protocol; Standing  -     Verify NPO; Standing  -     Verify Bowel Prep Was Successful; Standing  -     Give Tap Water Enema If Bowel Prep Insufficient; Standing  -     Obtain Informed Consent; Standing  -     Case Request    Other orders  -     Sodium Sulfate-Mag Sulfate-KCl (Sutab) 9205-578-672 MG tablet; Take 12 tablets by mouth Take As Directed. Take 12 tablets at 6 pm and 12 tablets 4 hours prior to procedure.  Dispense: 24 tablet; Refill: 0    68 year old patient presents to the office for change in bowel habits, history of polpys, and family history of colon cancer (mother 50s). Bowel habits changed to more constipation about 3 months ago. Constipation relieved when taking daily fiber supplement. She will proceed with colonoscopy at her earliest convenience. Patient is agreeable to plan and will call the office with any questions or concerns.     COLONOSCOPY Surgical Risk and Benefits: Possible risk/complications, benefits, and alternatives to surgical or invasive procedure have been explained to patient and/or legal guardian. Risks include bleeding, infection, and perforation. Patient has been evaluated and can tolerate anesthesia and/or sedation. Risk, benefits, and alternatives to anesthesia and sedation have been explained to patient and/or legal guardian.     Follow Up   No follow-ups on file.  Patient was given instructions and counseling regarding her condition or for health maintenance advice. Please see specific information pulled into the AVS if appropriate.

## 2024-01-30 NOTE — H&P (VIEW-ONLY)
Chief Complaint  Constipation, Diarrhea, and Abdominal Pain (- Cramping )    Charla Holman is a 68 y.o. female who presents to [facility] [place] for change in bowel habits.    History of present Illness  Patient presents to the office for change in bowel habits, history of polpys, and family history of colon cancer. Bowel habits changed to more constipation about 3 months ago. Constipation relieved when taking daily fiber supplement. Denies starting any new medication during this time frame. Denies abdominal pain, melena, and hematochezia. Denies unintentional weight loss. Denies upper GI symptoms.     Colonoscopy 8/23/2021 by Dr. Uriarte-small polyp in the ascending colon, small polyp in the transverse colon, and grade 1 internal hemorrhoids. Polyps - tubular adenoma. Repeat colonoscopy in 5 years.    Past Medical History:   Diagnosis Date    Acute cystitis 09/19/2014    Anxiety     Asthma 02/21/2014    Breast cancer     Cancer     Glaucoma     High cholesterol 02/21/2014    Hyperlipemia     Limb swelling     Microscopic hematuria     UTI (urinary tract infection)        Past Surgical History:   Procedure Laterality Date    APPENDECTOMY  2006    BREAST SURGERY  2007    CHOLECYSTECTOMY  2000    COLONOSCOPY  01/11/2016    DR. RIVERA GODINEZ    COLONOSCOPY N/A 8/23/2021    Procedure: COLONOSCOPY WITH POLYPECTOMY;  Surgeon: Ravinder Uriarte MD;  Location: [facility] ENDOSCOPY;  Service: Gastroenterology;  Laterality: N/A;  COLON POLYPS, HEMORRHOIDS    EYE SURGERY      EYE IMPLANT    GALLBLADDER SURGERY      HYSTERECTOMY      PARTIAL    MASTECTOMY Left     SUBTOTAL HYSTERECTOMY           Current Outpatient Medications:     albuterol sulfate HFA (Ventolin HFA) 108 (90 Base) MCG/ACT inhaler, Inhale 2 puffs Every 6 (Six) Hours As Needed for Wheezing., Disp: 18 g, Rfl: 1    aspirin 81 MG EC tablet, aspirin 81 mg oral tablet,delayed release (DR/EC) take 1 tablet (81 mg) by oral route  "once daily   Active, Disp: , Rfl:     bimatoprost (Lumigan) 0.01 % ophthalmic drops, 1 drop., Disp: , Rfl:     brimonidine-timolol (Combigan) 0.2-0.5 % ophthalmic solution, 1 drop., Disp: , Rfl:     coenzyme Q10 100 MG capsule, Take 1 capsule by mouth Daily., Disp: , Rfl:     dorzolamide (TRUSOPT) 2 % ophthalmic solution, , Disp: , Rfl:     lisinopril-hydrochlorothiazide (PRINZIDE,ZESTORETIC) 10-12.5 MG per tablet, TAKE 1/2 TABLET BY MOUTH ONCE DAILY FOR BLOOD PRESSURE, Disp: 45 tablet, Rfl: 1    LORazepam (ATIVAN) 1 MG tablet, TAKE 1 TABLET BY MOUTH EVERY 8 HOURS AS NEEDED FOR ANXIETY, Disp: 60 tablet, Rfl: 0    Rhopressa 0.02 % solution, INSTILL ONE DROP INTO BOTH EYES ONCE DAILY AT BEDTIME., Disp: , Rfl:     simvastatin (ZOCOR) 10 MG tablet, Take 1 tablet by mouth Every Evening. for cholesterol, Disp: 90 tablet, Rfl: 1    Sodium Sulfate-Mag Sulfate-KCl (Sutab) 8625-802-026 MG tablet, Take 12 tablets by mouth Take As Directed. Take 12 tablets at 6 pm and 12 tablets 4 hours prior to procedure., Disp: 24 tablet, Rfl: 0     Allergies   Allergen Reactions    Norco [Hydrocodone-Acetaminophen] Headache    Codeine Unknown - Low Severity    Macrobid [Nitrofurantoin] Unknown - Low Severity       Family History   Problem Relation Age of Onset    Heart disease Mother     Cancer Mother         Colon    Colon cancer Mother 56    Diabetes Father     Hypertension Father     Alcohol abuse Father     Heart disease Father     Breast cancer Sister     Cancer Sister         Breast    Cancer Daughter         Breast    Malig Hyperthermia Neg Hx         Social History     Social History Narrative    Not on file       Objective       Vital Signs:   /58 (BP Location: Left arm, Patient Position: Sitting, Cuff Size: Adult)   Pulse 80   Ht 165.1 cm (65\")   Wt 70.7 kg (155 lb 12.8 oz)   SpO2 97%   BMI 25.93 kg/m²       Physical Exam  Constitutional:       Appearance: Normal appearance. She is normal weight.   HENT:      Head: " Normocephalic and atraumatic.      Nose: Nose normal.   Pulmonary:      Effort: Pulmonary effort is normal.   Skin:     General: Skin is warm and dry.   Neurological:      Mental Status: She is alert and oriented to person, place, and time. Mental status is at baseline.   Psychiatric:         Mood and Affect: Mood normal.         Behavior: Behavior normal.         Thought Content: Thought content normal.         Judgment: Judgment normal.         Result Review :       CBC w/diff          9/26/2023    08:51   CBC w/Diff   WBC 5.19    RBC 3.93    Hemoglobin 12.7    Hematocrit 38.1    MCV 96.9    MCH 32.3    MCHC 33.3    RDW 13.0    Platelets 253    Neutrophil Rel % 55.7    Immature Granulocyte Rel % 0.0    Lymphocyte Rel % 27.6    Monocyte Rel % 9.2    Eosinophil Rel % 6.9    Basophil Rel % 0.6      CMP          9/26/2023    08:51   CMP   Glucose 87    BUN 19    Creatinine 0.89    EGFR 70.3    Sodium 139    Potassium 4.7    Chloride 104    Calcium 9.7    Total Protein 6.7    Albumin 4.1    Globulin 2.6    Total Bilirubin 0.6    Alkaline Phosphatase 63    AST (SGOT) 24    ALT (SGPT) 20    Albumin/Globulin Ratio 1.6    BUN/Creatinine Ratio 21.3    Anion Gap 9.6                    Assessment and Plan    Diagnoses and all orders for this visit:    1. Change in bowel habits (Primary)  -     Case Request; Standing  -     Follow Anesthesia Guidelines / Protocol; Standing  -     Verify NPO; Standing  -     Verify Bowel Prep Was Successful; Standing  -     Give Tap Water Enema If Bowel Prep Insufficient; Standing  -     Obtain Informed Consent; Standing  -     Case Request    2. Constipation, unspecified constipation type  -     Case Request; Standing  -     Follow Anesthesia Guidelines / Protocol; Standing  -     Verify NPO; Standing  -     Verify Bowel Prep Was Successful; Standing  -     Give Tap Water Enema If Bowel Prep Insufficient; Standing  -     Obtain Informed Consent; Standing  -     Case Request    3. History of  colon polyps  -     Case Request; Standing  -     Follow Anesthesia Guidelines / Protocol; Standing  -     Verify NPO; Standing  -     Verify Bowel Prep Was Successful; Standing  -     Give Tap Water Enema If Bowel Prep Insufficient; Standing  -     Obtain Informed Consent; Standing  -     Case Request    4. Family history of colon cancer  -     Case Request; Standing  -     Follow Anesthesia Guidelines / Protocol; Standing  -     Verify NPO; Standing  -     Verify Bowel Prep Was Successful; Standing  -     Give Tap Water Enema If Bowel Prep Insufficient; Standing  -     Obtain Informed Consent; Standing  -     Case Request    Other orders  -     Sodium Sulfate-Mag Sulfate-KCl (Sutab) 1627-414-051 MG tablet; Take 12 tablets by mouth Take As Directed. Take 12 tablets at 6 pm and 12 tablets 4 hours prior to procedure.  Dispense: 24 tablet; Refill: 0    68 year old patient presents to the office for change in bowel habits, history of polpys, and family history of colon cancer (mother 50s). Bowel habits changed to more constipation about 3 months ago. Constipation relieved when taking daily fiber supplement. She will proceed with colonoscopy at her earliest convenience. Patient is agreeable to plan and will call the office with any questions or concerns.     COLONOSCOPY Surgical Risk and Benefits: Possible risk/complications, benefits, and alternatives to surgical or invasive procedure have been explained to patient and/or legal guardian. Risks include bleeding, infection, and perforation. Patient has been evaluated and can tolerate anesthesia and/or sedation. Risk, benefits, and alternatives to anesthesia and sedation have been explained to patient and/or legal guardian.     Follow Up   No follow-ups on file.  Patient was given instructions and counseling regarding her condition or for health maintenance advice. Please see specific information pulled into the AVS if appropriate.

## 2024-02-12 NOTE — PAT
Reviewed the following with patient.    Arrival time of 0830.    Must have  over 18 for transportation home post procedure.    Education provided on laxative administration; bowel prep to be taken in two doses. Reviewed diet instructions for day prior to procedure. Only plain, unflavored water after midnight until two hours prior to arrival time.     Do not take any morning medications on the day of the procedure. Instead bring all prescribed medication and inhalers to the hospital the morning of the procedure. May take any nebulizer treatments or inhalers the morning of the procedure.     Pt verbalized understanding of instructions.

## 2024-02-15 ENCOUNTER — ANESTHESIA EVENT (OUTPATIENT)
Dept: GASTROENTEROLOGY | Facility: HOSPITAL | Age: 69
End: 2024-02-15
Payer: MEDICARE

## 2024-02-16 ENCOUNTER — HOSPITAL ENCOUNTER (OUTPATIENT)
Facility: HOSPITAL | Age: 69
Setting detail: HOSPITAL OUTPATIENT SURGERY
Discharge: HOME | End: 2024-02-16
Attending: INTERNAL MEDICINE | Admitting: INTERNAL MEDICINE
Payer: MEDICARE

## 2024-02-16 ENCOUNTER — ANESTHESIA (OUTPATIENT)
Dept: GASTROENTEROLOGY | Facility: HOSPITAL | Age: 69
End: 2024-02-16
Payer: MEDICARE

## 2024-02-16 VITALS
WEIGHT: 153 LBS | TEMPERATURE: 97.5 F | DIASTOLIC BLOOD PRESSURE: 61 MMHG | HEIGHT: 65 IN | HEART RATE: 73 BPM | BODY MASS INDEX: 25.49 KG/M2 | RESPIRATION RATE: 16 BRPM | OXYGEN SATURATION: 98 % | SYSTOLIC BLOOD PRESSURE: 102 MMHG

## 2024-02-16 DIAGNOSIS — Z80.0 FAMILY HISTORY OF COLON CANCER: ICD-10-CM

## 2024-02-16 DIAGNOSIS — R19.4 CHANGE IN BOWEL HABITS: ICD-10-CM

## 2024-02-16 DIAGNOSIS — Z86.010 HISTORY OF COLON POLYPS: ICD-10-CM

## 2024-02-16 DIAGNOSIS — K59.00 CONSTIPATION, UNSPECIFIED CONSTIPATION TYPE: ICD-10-CM

## 2024-02-16 PROCEDURE — 25810000003 LACTATED RINGERS PER 1000 ML: Performed by: NURSE ANESTHETIST, CERTIFIED REGISTERED

## 2024-02-16 PROCEDURE — 25010000002 PROPOFOL 10 MG/ML EMULSION: Performed by: NURSE ANESTHETIST, CERTIFIED REGISTERED

## 2024-02-16 PROCEDURE — 88305 TISSUE EXAM BY PATHOLOGIST: CPT | Performed by: INTERNAL MEDICINE

## 2024-02-16 RX ORDER — SODIUM CHLORIDE, SODIUM LACTATE, POTASSIUM CHLORIDE, CALCIUM CHLORIDE 600; 310; 30; 20 MG/100ML; MG/100ML; MG/100ML; MG/100ML
30 INJECTION, SOLUTION INTRAVENOUS CONTINUOUS
Status: DISCONTINUED | OUTPATIENT
Start: 2024-02-16 | End: 2024-02-16 | Stop reason: HOSPADM

## 2024-02-16 RX ORDER — SODIUM CHLORIDE, SODIUM LACTATE, POTASSIUM CHLORIDE, CALCIUM CHLORIDE 600; 310; 30; 20 MG/100ML; MG/100ML; MG/100ML; MG/100ML
INJECTION, SOLUTION INTRAVENOUS CONTINUOUS PRN
Status: DISCONTINUED | OUTPATIENT
Start: 2024-02-16 | End: 2024-02-16 | Stop reason: SURG

## 2024-02-16 RX ORDER — SODIUM CHLORIDE, SODIUM LACTATE, POTASSIUM CHLORIDE, CALCIUM CHLORIDE 600; 310; 30; 20 MG/100ML; MG/100ML; MG/100ML; MG/100ML
1000 INJECTION, SOLUTION INTRAVENOUS CONTINUOUS
Status: DISCONTINUED | OUTPATIENT
Start: 2024-02-16 | End: 2024-02-16 | Stop reason: HOSPADM

## 2024-02-16 RX ORDER — LIDOCAINE HYDROCHLORIDE 10 MG/ML
0.5 INJECTION, SOLUTION INFILTRATION; PERINEURAL ONCE AS NEEDED
Status: DISCONTINUED | OUTPATIENT
Start: 2024-02-16 | End: 2024-02-16 | Stop reason: HOSPADM

## 2024-02-16 RX ORDER — PROPOFOL 10 MG/ML
VIAL (ML) INTRAVENOUS AS NEEDED
Status: DISCONTINUED | OUTPATIENT
Start: 2024-02-16 | End: 2024-02-16 | Stop reason: SURG

## 2024-02-16 RX ORDER — LIDOCAINE HYDROCHLORIDE 20 MG/ML
INJECTION, SOLUTION EPIDURAL; INFILTRATION; INTRACAUDAL; PERINEURAL AS NEEDED
Status: DISCONTINUED | OUTPATIENT
Start: 2024-02-16 | End: 2024-02-16 | Stop reason: SURG

## 2024-02-16 RX ORDER — SODIUM CHLORIDE 0.9 % (FLUSH) 0.9 %
10 SYRINGE (ML) INJECTION AS NEEDED
Status: DISCONTINUED | OUTPATIENT
Start: 2024-02-16 | End: 2024-02-16 | Stop reason: HOSPADM

## 2024-02-16 RX ADMIN — PROPOFOL 200 MCG/KG/MIN: 10 INJECTION, EMULSION INTRAVENOUS at 10:05

## 2024-02-16 RX ADMIN — SODIUM CHLORIDE, POTASSIUM CHLORIDE, SODIUM LACTATE AND CALCIUM CHLORIDE 1000 ML: 600; 310; 30; 20 INJECTION, SOLUTION INTRAVENOUS at 09:25

## 2024-02-16 RX ADMIN — SODIUM CHLORIDE, POTASSIUM CHLORIDE, SODIUM LACTATE AND CALCIUM CHLORIDE: 600; 310; 30; 20 INJECTION, SOLUTION INTRAVENOUS at 09:52

## 2024-02-16 RX ADMIN — PROPOFOL 50 MG: 10 INJECTION, EMULSION INTRAVENOUS at 09:52

## 2024-02-16 RX ADMIN — LIDOCAINE HYDROCHLORIDE 80 MG: 20 INJECTION, SOLUTION EPIDURAL; INFILTRATION; INTRACAUDAL; PERINEURAL at 09:52

## 2024-02-16 RX ADMIN — PROPOFOL 50 MG: 10 INJECTION, EMULSION INTRAVENOUS at 09:54

## 2024-02-26 ENCOUNTER — TELEMEDICINE (OUTPATIENT)
Dept: FAMILY MEDICINE CLINIC | Facility: CLINIC | Age: 69
End: 2024-02-26
Payer: MEDICARE

## 2024-02-26 DIAGNOSIS — E78.2 MIXED HYPERLIPIDEMIA: ICD-10-CM

## 2024-02-26 DIAGNOSIS — Z79.899 LONG TERM USE OF DRUG: ICD-10-CM

## 2024-02-26 DIAGNOSIS — F41.9 ANXIETY: ICD-10-CM

## 2024-02-26 DIAGNOSIS — I10 PRIMARY HYPERTENSION: Primary | ICD-10-CM

## 2024-02-26 DIAGNOSIS — Z12.31 ENCOUNTER FOR SCREENING MAMMOGRAM FOR MALIGNANT NEOPLASM OF BREAST: ICD-10-CM

## 2024-02-26 PROCEDURE — 1159F MED LIST DOCD IN RCRD: CPT | Performed by: NURSE PRACTITIONER

## 2024-02-26 PROCEDURE — 1160F RVW MEDS BY RX/DR IN RCRD: CPT | Performed by: NURSE PRACTITIONER

## 2024-02-26 PROCEDURE — 99214 OFFICE O/P EST MOD 30 MIN: CPT | Performed by: NURSE PRACTITIONER

## 2024-02-26 RX ORDER — SIMVASTATIN 10 MG
10 TABLET ORAL EVERY EVENING
Qty: 90 TABLET | Refills: 1 | Status: SHIPPED | OUTPATIENT
Start: 2024-02-26

## 2024-02-26 RX ORDER — LISINOPRIL AND HYDROCHLOROTHIAZIDE 12.5; 1 MG/1; MG/1
0.5 TABLET ORAL DAILY
Qty: 45 TABLET | Refills: 1 | Status: SHIPPED | OUTPATIENT
Start: 2024-02-26

## 2024-02-26 NOTE — PROGRESS NOTES
You have chosen to receive care through a telehealth visit.  Do you consent to use a video/audio connection for your medical care today? Yes    Mode of visit: video    Location of patient: [place], [place]    Location of provider: [facility]       Chief Complaint  Hypertension, Hyperlipidemia, and Anxiety    Subjective            Charla Holman presents to [facility]  History of Present Illness  Pt is a f/u for HTN, HLD, and anxiety. No issues or concerns at this time.    Pt is due labs  and UDS/orders placed.    Mammogram due 6/2024. Order placed.             Past Medical History:   Diagnosis Date    Acute cystitis 09/19/2014    Anxiety     Asthma 02/21/2014    Breast cancer     Cancer     Glaucoma     High cholesterol 02/21/2014    Hyperlipemia     Limb swelling     Microscopic hematuria     UTI (urinary tract infection)        Allergies   Allergen Reactions    Norco [Hydrocodone-Acetaminophen] Headache    Codeine Unknown - Low Severity    Macrobid [Nitrofurantoin] Unknown - Low Severity        Past Surgical History:   Procedure Laterality Date    APPENDECTOMY  2006    BREAST SURGERY  2007    CHOLECYSTECTOMY  2000    COLONOSCOPY  01/11/2016    DR. RIVERA GODINEZ    COLONOSCOPY N/A 8/23/2021    Procedure: COLONOSCOPY WITH POLYPECTOMY;  Surgeon: Ravinder Uriarte MD;  Location: [facility] ENDOSCOPY;  Service: Gastroenterology;  Laterality: N/A;  COLON POLYPS, HEMORRHOIDS    COLONOSCOPY N/A 2/16/2024    Procedure: COLONOSCOPY WITH POLYPECTOMIES;  Surgeon: Ravinder Uriarte MD;  Location: [facility] ENDOSCOPY;  Service: Gastroenterology;  Laterality: N/A;  COLON POLYPS, DIVERTICULOSIS    EYE SURGERY      EYE IMPLANT    GALLBLADDER SURGERY      HYSTERECTOMY      PARTIAL    MASTECTOMY Left     SUBTOTAL HYSTERECTOMY          Social History     Tobacco Use    Smoking status: Never     Passive exposure: Never    Smokeless tobacco: Never   Substance Use Topics    Alcohol use: Not  Currently     Comment: RARELY       Family History   Problem Relation Age of Onset    Heart disease Mother     Cancer Mother         Colon    Colon cancer Mother 56    Diabetes Father     Hypertension Father     Alcohol abuse Father     Heart disease Father     Breast cancer Sister     Cancer Sister         Breast    Cancer Daughter         Breast    Malig Hyperthermia Neg Hx         Current Outpatient Medications on File Prior to Visit   Medication Sig    albuterol sulfate HFA (Ventolin HFA) 108 (90 Base) MCG/ACT inhaler Inhale 2 puffs Every 6 (Six) Hours As Needed for Wheezing.    aspirin 81 MG EC tablet aspirin 81 mg oral tablet,delayed release (DR/EC) take 1 tablet (81 mg) by oral route once daily   Active    bimatoprost (Lumigan) 0.01 % ophthalmic drops 1 drop.    brimonidine-timolol (Combigan) 0.2-0.5 % ophthalmic solution 1 drop.    coenzyme Q10 100 MG capsule Take 1 capsule by mouth Daily.    dorzolamide (TRUSOPT) 2 % ophthalmic solution     LORazepam (ATIVAN) 1 MG tablet TAKE 1 TABLET BY MOUTH EVERY 8 HOURS AS NEEDED FOR ANXIETY    Rhopressa 0.02 % solution INSTILL ONE DROP INTO BOTH EYES ONCE DAILY AT BEDTIME.    [DISCONTINUED] lisinopril-hydrochlorothiazide (PRINZIDE,ZESTORETIC) 10-12.5 MG per tablet TAKE 1/2 TABLET BY MOUTH ONCE DAILY FOR BLOOD PRESSURE    [DISCONTINUED] simvastatin (ZOCOR) 10 MG tablet Take 1 tablet by mouth Every Evening. for cholesterol     No current facility-administered medications on file prior to visit.       Health Maintenance Due   Topic Date Due    RSV Vaccine - Adults (1 - 1-dose 60+ series) Never done       Objective     There were no vitals taken for this visit.      Physical Exam  Constitutional:       Appearance: Normal appearance.   Neurological:      Mental Status: She is alert.   Psychiatric:         Attention and Perception: Attention normal.         Mood and Affect: Mood normal.         Speech: Speech normal.         Behavior: Behavior normal. Behavior is  cooperative.         Thought Content: Thought content normal. Thought content does not include suicidal ideation.           Result Review :                           Assessment and Plan        Diagnoses and all orders for this visit:    1. Primary hypertension (Primary)  Comments:  stableon zestoretic 10/12.5mg, continue  Orders:  -     CBC w AUTO Differential; Future  -     lisinopril-hydrochlorothiazide (PRINZIDE,ZESTORETIC) 10-12.5 MG per tablet; Take 0.5 tablets by mouth Daily.  Dispense: 45 tablet; Refill: 1    2. Anxiety  Comments:  stable on Ativan 1mg prn, continue  Orders:  -     Urine Drug Screen - Urine, Clean Catch; Future    3. Long term use of drug  -     Urine Drug Screen - Urine, Clean Catch; Future    4. Encounter for screening mammogram for malignant neoplasm of breast  -     Mammo Screening Digital Tomosynthesis Bilateral With CAD; Future    5. Mixed hyperlipidemia  Comments:  stable on Zocor 10mg, continue  Orders:  -     Comprehensive metabolic panel; Future  -     Lipid panel; Future  -     simvastatin (ZOCOR) 10 MG tablet; Take 1 tablet by mouth Every Evening. for cholesterol  Dispense: 90 tablet; Refill: 1              Follow Up     Return in about 6 months (around 8/26/2024).    Patient was given instructions and counseling regarding her condition or for health maintenance advice. Please see specific information pulled into the AVS if appropriate.     Charla Holman  reports that she has never smoked. She has never been exposed to tobacco smoke. She has never used smokeless tobacco..

## 2024-03-04 ENCOUNTER — LAB (OUTPATIENT)
Dept: LAB | Facility: HOSPITAL | Age: 69
End: 2024-03-04
Payer: MEDICARE

## 2024-03-04 DIAGNOSIS — E78.2 MIXED HYPERLIPIDEMIA: ICD-10-CM

## 2024-03-04 DIAGNOSIS — F41.9 ANXIETY: ICD-10-CM

## 2024-03-04 DIAGNOSIS — Z79.899 LONG TERM USE OF DRUG: ICD-10-CM

## 2024-03-04 DIAGNOSIS — I10 PRIMARY HYPERTENSION: ICD-10-CM

## 2024-03-04 LAB
ALBUMIN SERPL-MCNC: 4.3 G/DL (ref 3.5–5.2)
ALBUMIN/GLOB SERPL: 1.7 G/DL
ALP SERPL-CCNC: 75 U/L (ref 39–117)
ALT SERPL W P-5'-P-CCNC: 16 U/L (ref 1–33)
AMPHET+METHAMPHET UR QL: NEGATIVE
ANION GAP SERPL CALCULATED.3IONS-SCNC: 11.3 MMOL/L (ref 5–15)
AST SERPL-CCNC: 19 U/L (ref 1–32)
BARBITURATES UR QL SCN: NEGATIVE
BASOPHILS # BLD AUTO: 0.03 10*3/MM3 (ref 0–0.2)
BASOPHILS NFR BLD AUTO: 0.5 % (ref 0–1.5)
BENZODIAZ UR QL SCN: NEGATIVE
BILIRUB SERPL-MCNC: 0.4 MG/DL (ref 0–1.2)
BUN SERPL-MCNC: 22 MG/DL (ref 8–23)
BUN/CREAT SERPL: 21.8 (ref 7–25)
CALCIUM SPEC-SCNC: 9.5 MG/DL (ref 8.6–10.5)
CANNABINOIDS SERPL QL: NEGATIVE
CHLORIDE SERPL-SCNC: 105 MMOL/L (ref 98–107)
CHOLEST SERPL-MCNC: 181 MG/DL (ref 0–200)
CO2 SERPL-SCNC: 24.7 MMOL/L (ref 22–29)
COCAINE UR QL: NEGATIVE
CREAT SERPL-MCNC: 1.01 MG/DL (ref 0.57–1)
DEPRECATED RDW RBC AUTO: 45.6 FL (ref 37–54)
EGFRCR SERPLBLD CKD-EPI 2021: 60.4 ML/MIN/1.73
EOSINOPHIL # BLD AUTO: 0.24 10*3/MM3 (ref 0–0.4)
EOSINOPHIL NFR BLD AUTO: 4.2 % (ref 0.3–6.2)
ERYTHROCYTE [DISTWIDTH] IN BLOOD BY AUTOMATED COUNT: 13 % (ref 12.3–15.4)
FENTANYL UR-MCNC: NEGATIVE NG/ML
GLOBULIN UR ELPH-MCNC: 2.5 GM/DL
GLUCOSE SERPL-MCNC: 81 MG/DL (ref 65–99)
HCT VFR BLD AUTO: 38.2 % (ref 34–46.6)
HDLC SERPL-MCNC: 68 MG/DL (ref 40–60)
HGB BLD-MCNC: 12.8 G/DL (ref 12–15.9)
IMM GRANULOCYTES # BLD AUTO: 0.01 10*3/MM3 (ref 0–0.05)
IMM GRANULOCYTES NFR BLD AUTO: 0.2 % (ref 0–0.5)
LDLC SERPL CALC-MCNC: 102 MG/DL (ref 0–100)
LDLC/HDLC SERPL: 1.5 {RATIO}
LYMPHOCYTES # BLD AUTO: 1.33 10*3/MM3 (ref 0.7–3.1)
LYMPHOCYTES NFR BLD AUTO: 23.3 % (ref 19.6–45.3)
MCH RBC QN AUTO: 31.9 PG (ref 26.6–33)
MCHC RBC AUTO-ENTMCNC: 33.5 G/DL (ref 31.5–35.7)
MCV RBC AUTO: 95.3 FL (ref 79–97)
METHADONE UR QL SCN: NEGATIVE
MONOCYTES # BLD AUTO: 0.52 10*3/MM3 (ref 0.1–0.9)
MONOCYTES NFR BLD AUTO: 9.1 % (ref 5–12)
NEUTROPHILS NFR BLD AUTO: 3.58 10*3/MM3 (ref 1.7–7)
NEUTROPHILS NFR BLD AUTO: 62.7 % (ref 42.7–76)
NRBC BLD AUTO-RTO: 0 /100 WBC (ref 0–0.2)
OPIATES UR QL: NEGATIVE
OXYCODONE UR QL SCN: NEGATIVE
PLATELET # BLD AUTO: 262 10*3/MM3 (ref 140–450)
PMV BLD AUTO: 11.4 FL (ref 6–12)
POTASSIUM SERPL-SCNC: 4.6 MMOL/L (ref 3.5–5.2)
PROT SERPL-MCNC: 6.8 G/DL (ref 6–8.5)
RBC # BLD AUTO: 4.01 10*6/MM3 (ref 3.77–5.28)
SODIUM SERPL-SCNC: 141 MMOL/L (ref 136–145)
TRIGL SERPL-MCNC: 56 MG/DL (ref 0–150)
VLDLC SERPL-MCNC: 11 MG/DL (ref 5–40)
WBC NRBC COR # BLD AUTO: 5.71 10*3/MM3 (ref 3.4–10.8)

## 2024-03-04 PROCEDURE — 80307 DRUG TEST PRSMV CHEM ANLYZR: CPT

## 2024-03-04 PROCEDURE — 80053 COMPREHEN METABOLIC PANEL: CPT

## 2024-03-04 PROCEDURE — 85025 COMPLETE CBC W/AUTO DIFF WBC: CPT

## 2024-03-04 PROCEDURE — 36415 COLL VENOUS BLD VENIPUNCTURE: CPT

## 2024-03-04 PROCEDURE — 80061 LIPID PANEL: CPT

## 2024-05-04 DIAGNOSIS — F41.9 ANXIETY: ICD-10-CM

## 2024-05-06 RX ORDER — LORAZEPAM 1 MG/1
1 TABLET ORAL EVERY 8 HOURS PRN
Qty: 60 TABLET | Refills: 0 | Status: SHIPPED | OUTPATIENT
Start: 2024-05-06

## 2024-06-24 ENCOUNTER — HOSPITAL ENCOUNTER (OUTPATIENT)
Dept: MAMMOGRAPHY | Facility: HOSPITAL | Age: 69
Discharge: HOME | End: 2024-06-24
Admitting: NURSE PRACTITIONER
Payer: MEDICARE

## 2024-06-24 DIAGNOSIS — Z12.31 ENCOUNTER FOR SCREENING MAMMOGRAM FOR MALIGNANT NEOPLASM OF BREAST: ICD-10-CM

## 2024-06-24 PROCEDURE — 77067 SCR MAMMO BI INCL CAD: CPT

## 2024-06-24 PROCEDURE — 77063 BREAST TOMOSYNTHESIS BI: CPT

## 2024-09-04 ENCOUNTER — OFFICE VISIT (OUTPATIENT)
Dept: FAMILY MEDICINE CLINIC | Facility: CLINIC | Age: 69
End: 2024-09-04
Payer: MEDICARE

## 2024-09-04 VITALS
HEART RATE: 66 BPM | HEIGHT: 65 IN | DIASTOLIC BLOOD PRESSURE: 53 MMHG | SYSTOLIC BLOOD PRESSURE: 129 MMHG | WEIGHT: 155 LBS | OXYGEN SATURATION: 98 % | BODY MASS INDEX: 25.83 KG/M2

## 2024-09-04 DIAGNOSIS — J45.20 MILD INTERMITTENT ASTHMA, UNSPECIFIED WHETHER COMPLICATED: ICD-10-CM

## 2024-09-04 DIAGNOSIS — Z79.899 LONG TERM USE OF DRUG: ICD-10-CM

## 2024-09-04 DIAGNOSIS — Z13.29 SCREENING FOR THYROID DISORDER: ICD-10-CM

## 2024-09-04 DIAGNOSIS — F41.9 ANXIETY: Primary | ICD-10-CM

## 2024-09-04 DIAGNOSIS — E78.2 MIXED HYPERLIPIDEMIA: ICD-10-CM

## 2024-09-04 DIAGNOSIS — I10 PRIMARY HYPERTENSION: ICD-10-CM

## 2024-09-04 LAB
AMPHET+METHAMPHET UR QL: NEGATIVE
AMPHETAMINE INTERNAL CONTROL: ABNORMAL
AMPHETAMINES UR QL: NEGATIVE
BARBITURATE INTERNAL CONTROL: ABNORMAL
BARBITURATES UR QL SCN: NEGATIVE
BENZODIAZ UR QL SCN: POSITIVE
BENZODIAZEPINE INTERNAL CONTROL: ABNORMAL
BUPRENORPHINE INTERNAL CONTROL: ABNORMAL
BUPRENORPHINE SERPL-MCNC: NEGATIVE NG/ML
CANNABINOIDS SERPL QL: NEGATIVE
COCAINE INTERNAL CONTROL: ABNORMAL
COCAINE UR QL: NEGATIVE
EXPIRATION DATE: ABNORMAL
Lab: ABNORMAL
MDMA (ECSTASY) INTERNAL CONTROL: ABNORMAL
MDMA UR QL SCN: NEGATIVE
METHADONE INTERNAL CONTROL: ABNORMAL
METHADONE UR QL SCN: NEGATIVE
METHAMPHETAMINE INTERNAL CONTROL: ABNORMAL
MORPHINE INTERNAL CONTROL: ABNORMAL
MORPHINE/OPIATES SCREEN, URINE: NEGATIVE
OXYCODONE INTERNAL CONTROL: ABNORMAL
OXYCODONE UR QL SCN: NEGATIVE
PCP UR QL SCN: NEGATIVE
PHENCYCLIDINE INTERNAL CONTROL: ABNORMAL
THC INTERNAL CONTROL: ABNORMAL

## 2024-09-04 PROCEDURE — 1126F AMNT PAIN NOTED NONE PRSNT: CPT | Performed by: NURSE PRACTITIONER

## 2024-09-04 PROCEDURE — 99214 OFFICE O/P EST MOD 30 MIN: CPT | Performed by: NURSE PRACTITIONER

## 2024-09-04 PROCEDURE — 1160F RVW MEDS BY RX/DR IN RCRD: CPT | Performed by: NURSE PRACTITIONER

## 2024-09-04 PROCEDURE — 1159F MED LIST DOCD IN RCRD: CPT | Performed by: NURSE PRACTITIONER

## 2024-09-04 RX ORDER — SIMVASTATIN 10 MG
10 TABLET ORAL EVERY EVENING
Qty: 90 TABLET | Refills: 1 | Status: SHIPPED | OUTPATIENT
Start: 2024-09-04

## 2024-09-04 RX ORDER — LISINOPRIL/HYDROCHLOROTHIAZIDE 10-12.5 MG
0.5 TABLET ORAL DAILY
Qty: 45 TABLET | Refills: 1 | Status: SHIPPED | OUTPATIENT
Start: 2024-09-04

## 2024-09-04 NOTE — PROGRESS NOTES
Chief Complaint  Asthma, Hypertension, Anxiety, and Hyperlipidemia    Subjective            Charla Holman presents to [facility]  History of Present Illness  Pt is a f/u for asthma, HTN, HLD, and anxiety. No issues or concerns at this time.     Pt is due labs/orders placed.    Pt will update UDS and CSA today.     MAW scheduled 9/30/24          Past Medical History:   Diagnosis Date    Acute cystitis 09/19/2014    Anxiety     Asthma 02/21/2014    Breast cancer     Cancer     Glaucoma     High cholesterol 02/21/2014    Hyperlipemia     Limb swelling     Microscopic hematuria     UTI (urinary tract infection)        Allergies   Allergen Reactions    Norco [Hydrocodone-Acetaminophen] Headache    Codeine Unknown - Low Severity    Macrobid [Nitrofurantoin] Unknown - Low Severity        Past Surgical History:   Procedure Laterality Date    APPENDECTOMY  2006    BREAST SURGERY  2007    CHOLECYSTECTOMY  2000    COLONOSCOPY  01/11/2016    DR. RIVERA GODINEZ    COLONOSCOPY N/A 8/23/2021    Procedure: COLONOSCOPY WITH POLYPECTOMY;  Surgeon: Ravinder Uriarte MD;  Location: [facility] ENDOSCOPY;  Service: Gastroenterology;  Laterality: N/A;  COLON POLYPS, HEMORRHOIDS    COLONOSCOPY N/A 2/16/2024    Procedure: COLONOSCOPY WITH POLYPECTOMIES;  Surgeon: Ravinder Uriarte MD;  Location: [facility] ENDOSCOPY;  Service: Gastroenterology;  Laterality: N/A;  COLON POLYPS, DIVERTICULOSIS    EYE SURGERY      EYE IMPLANT    GALLBLADDER SURGERY      HYSTERECTOMY      PARTIAL    MASTECTOMY Left     SUBTOTAL HYSTERECTOMY          Social History     Tobacco Use    Smoking status: Never     Passive exposure: Never    Smokeless tobacco: Never   Substance Use Topics    Alcohol use: Not Currently     Comment: RARELY       Family History   Problem Relation Age of Onset    Heart disease Mother     Cancer Mother         Colon    Colon cancer Mother 56    Diabetes Father     Hypertension Father     Alcohol abuse  "Father     Heart disease Father     Breast cancer Sister     Cancer Sister         Breast    Cancer Daughter         Breast    Malig Hyperthermia Neg Hx         Current Outpatient Medications on File Prior to Visit   Medication Sig    albuterol sulfate HFA (Ventolin HFA) 108 (90 Base) MCG/ACT inhaler Inhale 2 puffs Every 6 (Six) Hours As Needed for Wheezing.    aspirin 81 MG EC tablet aspirin 81 mg oral tablet,delayed release (DR/EC) take 1 tablet (81 mg) by oral route once daily   Active    bimatoprost (Lumigan) 0.01 % ophthalmic drops 1 drop.    brimonidine-timolol (Combigan) 0.2-0.5 % ophthalmic solution 1 drop.    coenzyme Q10 100 MG capsule Take 1 capsule by mouth Daily.    dorzolamide (TRUSOPT) 2 % ophthalmic solution     LORazepam (ATIVAN) 1 MG tablet TAKE 1 TABLET BY MOUTH EVERY 8 HOURS AS NEEDED FOR ANXIETY    Rhopressa 0.02 % solution INSTILL ONE DROP INTO BOTH EYES ONCE DAILY AT BEDTIME.    [DISCONTINUED] lisinopril-hydrochlorothiazide (PRINZIDE,ZESTORETIC) 10-12.5 MG per tablet Take 0.5 tablets by mouth Daily.    [DISCONTINUED] simvastatin (ZOCOR) 10 MG tablet Take 1 tablet by mouth Every Evening. for cholesterol     No current facility-administered medications on file prior to visit.       Health Maintenance Due   Topic Date Due    COVID-19 Vaccine (9 - 2023-24 season) 09/01/2024    INFLUENZA VACCINE  08/01/2024    ANNUAL WELLNESS VISIT  09/25/2024       Objective     /53   Pulse 66   Ht 165.1 cm (65\")   Wt 70.3 kg (155 lb)   SpO2 98%   BMI 25.79 kg/m²       Physical Exam  Constitutional:       General: She is not in acute distress.     Appearance: Normal appearance. She is not ill-appearing.   HENT:      Head: Normocephalic and atraumatic.      Right Ear: Tympanic membrane, ear canal and external ear normal.      Left Ear: Tympanic membrane, ear canal and external ear normal.      Nose: Nose normal.   Cardiovascular:      Rate and Rhythm: Normal rate and regular rhythm.      Pulses: Normal " pulses.      Heart sounds: Normal heart sounds. No murmur heard.  Pulmonary:      Effort: Pulmonary effort is normal. No respiratory distress.      Breath sounds: Normal breath sounds.   Chest:      Chest wall: No tenderness.   Abdominal:      General: Abdomen is flat. Bowel sounds are normal. There is no distension.      Palpations: Abdomen is soft. There is no mass.      Tenderness: There is no abdominal tenderness. There is no guarding.   Genitourinary:     Rectum: Normal.   Musculoskeletal:         General: No swelling or tenderness. Normal range of motion.      Cervical back: Normal range of motion and neck supple.   Skin:     General: Skin is warm and dry.      Findings: No rash.   Neurological:      General: No focal deficit present.      Mental Status: She is alert and oriented to person, place, and time. Mental status is at baseline.      Gait: Gait normal.   Psychiatric:         Attention and Perception: Attention normal.         Mood and Affect: Mood and affect normal.         Speech: Speech normal.         Behavior: Behavior normal. Behavior is cooperative.         Thought Content: Thought content normal. Thought content does not include suicidal ideation.         Judgment: Judgment normal.           Result Review :                           Assessment and Plan        Diagnoses and all orders for this visit:    1. Anxiety (Primary)  Comments:  stable on Ativan 1mg prn, continue, will update UDS/CSA today in office  Orders:  -     POC 12 Panel Urine Drug Screen    2. Long term use of drug  -     POC 12 Panel Urine Drug Screen    3. Screening for thyroid disorder  -     TSH; Future    4. Mild intermittent asthma, unspecified whether complicated  Comments:  stable on Albuterol prn, continue    5. Primary hypertension  Comments:  stableon zestoretic 10/12.5mg, continue  Orders:  -     CBC w AUTO Differential; Future  -     lisinopril-hydrochlorothiazide (PRINZIDE,ZESTORETIC) 10-12.5 MG per tablet; Take 0.5  tablets by mouth Daily.  Dispense: 45 tablet; Refill: 1    6. Mixed hyperlipidemia  Comments:  stable on Zocor 10mg, continue  Orders:  -     CBC w AUTO Differential; Future  -     Comprehensive metabolic panel; Future  -     Lipid panel; Future  -     simvastatin (ZOCOR) 10 MG tablet; Take 1 tablet by mouth Every Evening. for cholesterol  Dispense: 90 tablet; Refill: 1              Follow Up     Return in about 6 months (around 3/4/2025).    Patient was given instructions and counseling regarding her condition or for health maintenance advice. Please see specific information pulled into the AVS if appropriate.     Charla Holman  reports that she has never smoked. She has never been exposed to tobacco smoke. She has never used smokeless tobacco.

## 2024-09-17 ENCOUNTER — LAB (OUTPATIENT)
Dept: LAB | Facility: HOSPITAL | Age: 69
End: 2024-09-17
Payer: MEDICARE

## 2024-09-17 DIAGNOSIS — Z13.29 SCREENING FOR THYROID DISORDER: ICD-10-CM

## 2024-09-17 DIAGNOSIS — I10 PRIMARY HYPERTENSION: ICD-10-CM

## 2024-09-17 DIAGNOSIS — E78.2 MIXED HYPERLIPIDEMIA: ICD-10-CM

## 2024-09-17 LAB
ALBUMIN SERPL-MCNC: 4.3 G/DL (ref 3.5–5.2)
ALBUMIN/GLOB SERPL: 1.7 G/DL
ALP SERPL-CCNC: 77 U/L (ref 39–117)
ALT SERPL W P-5'-P-CCNC: 15 U/L (ref 1–33)
ANION GAP SERPL CALCULATED.3IONS-SCNC: 10 MMOL/L (ref 5–15)
AST SERPL-CCNC: 18 U/L (ref 1–32)
BASOPHILS # BLD AUTO: 0.04 10*3/MM3 (ref 0–0.2)
BASOPHILS NFR BLD AUTO: 0.7 % (ref 0–1.5)
BILIRUB SERPL-MCNC: 0.7 MG/DL (ref 0–1.2)
BUN SERPL-MCNC: 21 MG/DL (ref 8–23)
BUN/CREAT SERPL: 27.6 (ref 7–25)
CALCIUM SPEC-SCNC: 9.7 MG/DL (ref 8.6–10.5)
CHLORIDE SERPL-SCNC: 105 MMOL/L (ref 98–107)
CHOLEST SERPL-MCNC: 174 MG/DL (ref 0–200)
CO2 SERPL-SCNC: 24 MMOL/L (ref 22–29)
CREAT SERPL-MCNC: 0.76 MG/DL (ref 0.57–1)
DEPRECATED RDW RBC AUTO: 47.4 FL (ref 37–54)
EGFRCR SERPLBLD CKD-EPI 2021: 84.4 ML/MIN/1.73
EOSINOPHIL # BLD AUTO: 0.25 10*3/MM3 (ref 0–0.4)
EOSINOPHIL NFR BLD AUTO: 4.3 % (ref 0.3–6.2)
ERYTHROCYTE [DISTWIDTH] IN BLOOD BY AUTOMATED COUNT: 13.2 % (ref 12.3–15.4)
GLOBULIN UR ELPH-MCNC: 2.5 GM/DL
GLUCOSE SERPL-MCNC: 91 MG/DL (ref 65–99)
HCT VFR BLD AUTO: 39.5 % (ref 34–46.6)
HDLC SERPL-MCNC: 67 MG/DL (ref 40–60)
HGB BLD-MCNC: 12.8 G/DL (ref 12–15.9)
IMM GRANULOCYTES # BLD AUTO: 0.01 10*3/MM3 (ref 0–0.05)
IMM GRANULOCYTES NFR BLD AUTO: 0.2 % (ref 0–0.5)
LDLC SERPL CALC-MCNC: 94 MG/DL (ref 0–100)
LDLC/HDLC SERPL: 1.4 {RATIO}
LYMPHOCYTES # BLD AUTO: 1.44 10*3/MM3 (ref 0.7–3.1)
LYMPHOCYTES NFR BLD AUTO: 25 % (ref 19.6–45.3)
MCH RBC QN AUTO: 31.8 PG (ref 26.6–33)
MCHC RBC AUTO-ENTMCNC: 32.4 G/DL (ref 31.5–35.7)
MCV RBC AUTO: 98 FL (ref 79–97)
MONOCYTES # BLD AUTO: 0.57 10*3/MM3 (ref 0.1–0.9)
MONOCYTES NFR BLD AUTO: 9.9 % (ref 5–12)
NEUTROPHILS NFR BLD AUTO: 3.45 10*3/MM3 (ref 1.7–7)
NEUTROPHILS NFR BLD AUTO: 59.9 % (ref 42.7–76)
NRBC BLD AUTO-RTO: 0 /100 WBC (ref 0–0.2)
PLATELET # BLD AUTO: 283 10*3/MM3 (ref 140–450)
PMV BLD AUTO: 11.5 FL (ref 6–12)
POTASSIUM SERPL-SCNC: 4.2 MMOL/L (ref 3.5–5.2)
PROT SERPL-MCNC: 6.8 G/DL (ref 6–8.5)
RBC # BLD AUTO: 4.03 10*6/MM3 (ref 3.77–5.28)
SODIUM SERPL-SCNC: 139 MMOL/L (ref 136–145)
TRIGL SERPL-MCNC: 66 MG/DL (ref 0–150)
TSH SERPL DL<=0.05 MIU/L-ACNC: 2.57 UIU/ML (ref 0.27–4.2)
VLDLC SERPL-MCNC: 13 MG/DL (ref 5–40)
WBC NRBC COR # BLD AUTO: 5.76 10*3/MM3 (ref 3.4–10.8)

## 2024-09-17 PROCEDURE — 80053 COMPREHEN METABOLIC PANEL: CPT

## 2024-09-17 PROCEDURE — 36415 COLL VENOUS BLD VENIPUNCTURE: CPT

## 2024-09-17 PROCEDURE — 80061 LIPID PANEL: CPT

## 2024-09-17 PROCEDURE — 85025 COMPLETE CBC W/AUTO DIFF WBC: CPT

## 2024-09-17 PROCEDURE — 84443 ASSAY THYROID STIM HORMONE: CPT

## 2024-09-30 ENCOUNTER — TELEMEDICINE (OUTPATIENT)
Dept: FAMILY MEDICINE CLINIC | Facility: CLINIC | Age: 69
End: 2024-09-30
Payer: MEDICARE

## 2024-09-30 DIAGNOSIS — Z00.00 MEDICARE ANNUAL WELLNESS VISIT, SUBSEQUENT: Primary | ICD-10-CM

## 2024-09-30 PROCEDURE — 1160F RVW MEDS BY RX/DR IN RCRD: CPT | Performed by: NURSE PRACTITIONER

## 2024-09-30 PROCEDURE — 1126F AMNT PAIN NOTED NONE PRSNT: CPT | Performed by: NURSE PRACTITIONER

## 2024-09-30 PROCEDURE — 1159F MED LIST DOCD IN RCRD: CPT | Performed by: NURSE PRACTITIONER

## 2024-09-30 PROCEDURE — 1170F FXNL STATUS ASSESSED: CPT | Performed by: NURSE PRACTITIONER

## 2024-09-30 PROCEDURE — G0439 PPPS, SUBSEQ VISIT: HCPCS | Performed by: NURSE PRACTITIONER

## 2024-09-30 NOTE — PROGRESS NOTES
Subjective   The ABCs of the Annual Wellness Visit  Medicare Wellness Visit      Charla Holman is a 69 y.o. patient who presents for a Medicare Wellness Visit.    The following portions of the patient's history were reviewed and   updated as appropriate: allergies, current medications, past family history, past medical history, past social history, past surgical history, and problem list.    Compared to one year ago, the patient's physical   health is the same.  Compared to one year ago, the patient's mental   health is the same.    Recent Hospitalizations:  She was not admitted to the hospital during the last year.     Current Medical Providers:  Patient Care Team:  Marylou Hodgson APRN as PCP - General (Nurse Practitioner)    Outpatient Medications Prior to Visit   Medication Sig Dispense Refill    albuterol sulfate HFA (Ventolin HFA) 108 (90 Base) MCG/ACT inhaler Inhale 2 puffs Every 6 (Six) Hours As Needed for Wheezing. 18 g 1    aspirin 81 MG EC tablet aspirin 81 mg oral tablet,delayed release (DR/EC) take 1 tablet (81 mg) by oral route once daily   Active      bimatoprost (Lumigan) 0.01 % ophthalmic drops 1 drop.      brimonidine-timolol (Combigan) 0.2-0.5 % ophthalmic solution 1 drop.      coenzyme Q10 100 MG capsule Take 1 capsule by mouth Daily.      dorzolamide (TRUSOPT) 2 % ophthalmic solution       lisinopril-hydrochlorothiazide (PRINZIDE,ZESTORETIC) 10-12.5 MG per tablet Take 0.5 tablets by mouth Daily. 45 tablet 1    LORazepam (ATIVAN) 1 MG tablet TAKE 1 TABLET BY MOUTH EVERY 8 HOURS AS NEEDED FOR ANXIETY 60 tablet 0    Rhopressa 0.02 % solution INSTILL ONE DROP INTO BOTH EYES ONCE DAILY AT BEDTIME.      simvastatin (ZOCOR) 10 MG tablet Take 1 tablet by mouth Every Evening. for cholesterol 90 tablet 1     No facility-administered medications prior to visit.     No opioid medication identified on active medication list. I have reviewed chart for other potential  high risk medication/s and harmful  "drug interactions in the elderly.      Aspirin is on active medication list. Aspirin use is indicated based on review of current medical condition/s. Pros and cons of this therapy have been discussed today. Benefits of this medication outweigh potential harm.  Patient has been encouraged to continue taking this medication.  .      Patient Active Problem List   Diagnosis    Acute cystitis    Anxiety    Asthma    Cancer    Mixed hyperlipidemia    Malignant neoplasm of breast (female)    Change in bowel habits    Constipation    History of colon polyps    Family history of colon cancer     Advance Care Planning Advance Directive is not on file.  ACP discussion was held with the patient during this visit. Patient has an advance directive (not in EMR), copy requested.            Objective   Vitals:    09/30/24 1030   PainSc: 0-No pain       Estimated body mass index is 25.79 kg/m² as calculated from the following:    Height as of 9/4/24: 165.1 cm (65\").    Weight as of 9/4/24: 70.3 kg (155 lb).    BMI is >= 25 and <30. (Overweight) The following options were offered after discussion;: weight loss educational material (shared in after visit summary), exercise counseling/recommendations, and nutrition counseling/recommendations       Does the patient have evidence of cognitive impairment? No  Lab Results   Component Value Date    TRIG 66 09/17/2024    HDL 67 (H) 09/17/2024    LDL 94 09/17/2024    VLDL 13 09/17/2024                                                                                               Health  Risk Assessment    Smoking Status:  Social History     Tobacco Use   Smoking Status Never    Passive exposure: Never   Smokeless Tobacco Never     Alcohol Consumption:  Social History     Substance and Sexual Activity   Alcohol Use Not Currently    Comment: RARELY       Fall Risk Screen  STEADI Fall Risk Assessment was completed, and patient is at LOW risk for falls.Assessment completed " on:2024    Depression Screenin/30/2024    10:27 AM   PHQ-2/PHQ-9 Depression Screening   Little Interest or Pleasure in Doing Things 0-->not at all   Feeling Down, Depressed or Hopeless 0-->not at all   PHQ-9: Brief Depression Severity Measure Score 0     Health Habits and Functional and Cognitive Screenin/30/2024    10:28 AM   Functional & Cognitive Status   Do you have difficulty preparing food and eating? No   Do you have difficulty bathing yourself, getting dressed or grooming yourself? No   Do you have difficulty using the toilet? No   Do you have difficulty moving around from place to place? No   Do you have trouble with steps or getting out of a bed or a chair? No   Current Diet Well Balanced Diet   Dental Exam Up to date   Eye Exam Up to date   Exercise (times per week) 4 times per week   Current Exercises Include Walking   Do you need help using the phone?  No   Are you deaf or do you have serious difficulty hearing?  No   Do you need help to go to places out of walking distance? No   Do you need help shopping? No   Do you need help preparing meals?  No   Do you need help with housework?  No   Do you need help with laundry? No   Do you need help taking your medications? No   Do you need help managing money? No   Do you ever drive or ride in a car without wearing a seat belt? No   Have you felt unusual stress, anger or loneliness in the last month? No   Who do you live with? Spouse   If you need help, do you have trouble finding someone available to you? No   Have you been bothered in the last four weeks by sexual problems? No   Do you have difficulty concentrating, remembering or making decisions? No           Age-appropriate Screening Schedule:  Refer to the list below for future screening recommendations based on patient's age, sex and/or medical conditions. Orders for these recommended tests are listed in the plan section. The patient has been provided with a written plan.    Health  Maintenance List  Health Maintenance   Topic Date Due    COVID-19 Vaccine (9 - 2023-24 season) 09/01/2024    BMI FOLLOWUP  09/25/2024    INFLUENZA VACCINE  08/01/2024    LIPID PANEL  09/17/2025    ANNUAL WELLNESS VISIT  09/30/2025    DXA SCAN  12/14/2025    TDAP/TD VACCINES (3 - Td or Tdap) 05/17/2026    MAMMOGRAM  06/24/2026    COLORECTAL CANCER SCREENING  02/16/2029    HEPATITIS C SCREENING  Completed    Pneumococcal Vaccine 65+  Completed    ZOSTER VACCINE  Completed                                                                                                                                                CMS Preventative Services Quick Reference  Risk Factors Identified During Encounter  None Identified    The above risks/problems have been discussed with the patient.  Pertinent information has been shared with the patient in the After Visit Summary.  An After Visit Summary and PPPS were made available to the patient.    Follow Up:   Next Medicare Wellness visit to be scheduled in 1 year.     Assessment & Plan  Medicare annual wellness visit, subsequent               Follow Up:   Return in about 1 year (around 9/30/2025) for Medicare Wellness.

## 2024-11-27 DIAGNOSIS — F41.9 ANXIETY: ICD-10-CM

## 2024-11-27 RX ORDER — LORAZEPAM 1 MG/1
1 TABLET ORAL EVERY 8 HOURS PRN
Qty: 60 TABLET | Refills: 0 | Status: SHIPPED | OUTPATIENT
Start: 2024-11-27

## 2024-11-27 NOTE — TELEPHONE ENCOUNTER
Controlled Medication Refill Request:    1.  Last Office Visit:  9/4/24    2.  Next Office Visit:  3/11/2025     3.  Last UDS Date:  9/4/24    4.  Last Consent Signed:  9/4/24    5.  Medication Requested: Ativan  (Lorazepam)    LRX: 5/6/24 #60 0 RF    Pharmacy:   [place], [place] - [address] [place] [address] [phone]  - [phone] [address] [address]  Phone: [phone] Fax: [phone]

## 2025-01-31 DIAGNOSIS — F41.9 ANXIETY: ICD-10-CM

## 2025-01-31 DIAGNOSIS — J45.909 ASTHMA, UNSPECIFIED ASTHMA SEVERITY, UNSPECIFIED WHETHER COMPLICATED, UNSPECIFIED WHETHER PERSISTENT: ICD-10-CM

## 2025-01-31 RX ORDER — ALBUTEROL SULFATE 90 UG/1
2 INHALANT RESPIRATORY (INHALATION) EVERY 6 HOURS PRN
Qty: 18 G | Refills: 1 | Status: CANCELLED | OUTPATIENT
Start: 2025-01-31

## 2025-02-01 DIAGNOSIS — F41.9 ANXIETY: ICD-10-CM

## 2025-02-03 DIAGNOSIS — F41.9 ANXIETY: ICD-10-CM

## 2025-02-03 DIAGNOSIS — J45.909 ASTHMA, UNSPECIFIED ASTHMA SEVERITY, UNSPECIFIED WHETHER COMPLICATED, UNSPECIFIED WHETHER PERSISTENT: ICD-10-CM

## 2025-02-03 RX ORDER — LORAZEPAM 1 MG/1
1 TABLET ORAL EVERY 8 HOURS PRN
Qty: 60 TABLET | Refills: 0 | OUTPATIENT
Start: 2025-02-03

## 2025-02-03 RX ORDER — ALBUTEROL SULFATE 90 UG/1
2 INHALANT RESPIRATORY (INHALATION) EVERY 6 HOURS PRN
Qty: 18 G | Refills: 1 | OUTPATIENT
Start: 2025-02-03

## 2025-02-03 RX ORDER — ALBUTEROL SULFATE 90 UG/1
2 INHALANT RESPIRATORY (INHALATION) EVERY 6 HOURS PRN
Qty: 18 G | Refills: 1 | Status: SHIPPED | OUTPATIENT
Start: 2025-02-03

## 2025-02-03 RX ORDER — LORAZEPAM 1 MG/1
1 TABLET ORAL EVERY 8 HOURS PRN
Qty: 60 TABLET | Refills: 0 | Status: SHIPPED | OUTPATIENT
Start: 2025-02-03

## 2025-02-03 NOTE — TELEPHONE ENCOUNTER
Controlled Medication Refill Request:    1.  Last Office Visit:  9/4/24    2.  Next Office Visit:  3/11/25     3.  Last UDS Date:  9/4/24    4.  Last Consent Signed:  9/4/24    5.  Medication Requested: Ativan  (Lorazepam)    LRX: 11/27/24 #60 0 RF    Pharmacy:   [place], [place] - [address] [place] [address] [phone]  - [phone] [address] [address]  Phone: [phone] Fax: [phone]

## 2025-03-04 ENCOUNTER — TELEPHONE (OUTPATIENT)
Dept: FAMILY MEDICINE CLINIC | Facility: CLINIC | Age: 70
End: 2025-03-04
Payer: MEDICARE

## 2025-03-04 DIAGNOSIS — Z13.29 SCREENING FOR THYROID DISORDER: ICD-10-CM

## 2025-03-04 DIAGNOSIS — I10 PRIMARY HYPERTENSION: Primary | ICD-10-CM

## 2025-03-04 DIAGNOSIS — E78.5 HYPERLIPIDEMIA, UNSPECIFIED HYPERLIPIDEMIA TYPE: ICD-10-CM

## 2025-03-11 ENCOUNTER — OFFICE VISIT (OUTPATIENT)
Dept: FAMILY MEDICINE CLINIC | Facility: CLINIC | Age: 70
End: 2025-03-11
Payer: MEDICARE

## 2025-03-11 VITALS
HEIGHT: 65 IN | WEIGHT: 155 LBS | HEART RATE: 60 BPM | SYSTOLIC BLOOD PRESSURE: 130 MMHG | DIASTOLIC BLOOD PRESSURE: 49 MMHG | OXYGEN SATURATION: 100 % | BODY MASS INDEX: 25.83 KG/M2

## 2025-03-11 DIAGNOSIS — I10 PRIMARY HYPERTENSION: ICD-10-CM

## 2025-03-11 DIAGNOSIS — E78.5 HYPERLIPIDEMIA, UNSPECIFIED HYPERLIPIDEMIA TYPE: ICD-10-CM

## 2025-03-11 DIAGNOSIS — Z79.899 LONG TERM USE OF DRUG: ICD-10-CM

## 2025-03-11 DIAGNOSIS — Z12.31 ENCOUNTER FOR SCREENING MAMMOGRAM FOR MALIGNANT NEOPLASM OF BREAST: ICD-10-CM

## 2025-03-11 DIAGNOSIS — F41.9 ANXIETY: Primary | ICD-10-CM

## 2025-03-11 LAB
AMPHET+METHAMPHET UR QL: NEGATIVE
AMPHETAMINE INTERNAL CONTROL: NORMAL
AMPHETAMINES UR QL: NEGATIVE
BARBITURATE INTERNAL CONTROL: NORMAL
BARBITURATES UR QL SCN: NEGATIVE
BENZODIAZ UR QL SCN: NEGATIVE
BENZODIAZEPINE INTERNAL CONTROL: NORMAL
BUPRENORPHINE INTERNAL CONTROL: NORMAL
BUPRENORPHINE SERPL-MCNC: NEGATIVE NG/ML
CANNABINOIDS SERPL QL: NEGATIVE
COCAINE INTERNAL CONTROL: NORMAL
COCAINE UR QL: NEGATIVE
EXPIRATION DATE: NORMAL
Lab: NORMAL
MDMA (ECSTASY) INTERNAL CONTROL: NORMAL
MDMA UR QL SCN: NEGATIVE
METHADONE INTERNAL CONTROL: NORMAL
METHADONE UR QL SCN: NEGATIVE
METHAMPHETAMINE INTERNAL CONTROL: NORMAL
MORPHINE INTERNAL CONTROL: NORMAL
MORPHINE/OPIATES SCREEN, URINE: NEGATIVE
OXYCODONE INTERNAL CONTROL: NORMAL
OXYCODONE UR QL SCN: NEGATIVE
PCP UR QL SCN: NEGATIVE
PHENCYCLIDINE INTERNAL CONTROL: NORMAL
THC INTERNAL CONTROL: NORMAL

## 2025-03-11 RX ORDER — LISINOPRIL AND HYDROCHLOROTHIAZIDE 10; 12.5 MG/1; MG/1
0.5 TABLET ORAL DAILY
Qty: 45 TABLET | Refills: 1 | Status: SHIPPED | OUTPATIENT
Start: 2025-03-11

## 2025-03-11 RX ORDER — SIMVASTATIN 10 MG
10 TABLET ORAL EVERY EVENING
Qty: 90 TABLET | Refills: 1 | Status: SHIPPED | OUTPATIENT
Start: 2025-03-11

## 2025-03-11 NOTE — PROGRESS NOTES
Chief Complaint  Hypertension, Anxiety, and Hyperlipidemia    Subjective            Charla Holman presents to [facility]  History of Present Illness  Pt is a f/u for anxiety, HTN, and HLD. No issues or concerns at this time.     Pt is due labs. Orders pending.    Pt will update UDS, JEFFREY and CSA today in clinic.    Pt is due mammogram 6/25/25, orders placed.        Past Medical History:   Diagnosis Date    Acute cystitis 09/19/2014    Anxiety     Asthma 02/21/2014    Breast cancer     Cancer     Glaucoma     High cholesterol 02/21/2014    Hyperlipemia     Limb swelling     Microscopic hematuria     UTI (urinary tract infection)        Allergies   Allergen Reactions    Norco [Hydrocodone-Acetaminophen] Headache    Codeine Unknown - Low Severity    Macrobid [Nitrofurantoin] Unknown - Low Severity        Past Surgical History:   Procedure Laterality Date    APPENDECTOMY  2006    BREAST SURGERY  2007    CHOLECYSTECTOMY  2000    COLONOSCOPY  01/11/2016    DR. RIVERA GODINEZ    COLONOSCOPY N/A 8/23/2021    Procedure: COLONOSCOPY WITH POLYPECTOMY;  Surgeon: Ravinder Uriarte MD;  Location: [facility] ENDOSCOPY;  Service: Gastroenterology;  Laterality: N/A;  COLON POLYPS, HEMORRHOIDS    COLONOSCOPY N/A 2/16/2024    Procedure: COLONOSCOPY WITH POLYPECTOMIES;  Surgeon: Ravinder Uriarte MD;  Location: [facility] ENDOSCOPY;  Service: Gastroenterology;  Laterality: N/A;  COLON POLYPS, DIVERTICULOSIS    EYE SURGERY      EYE IMPLANT    GALLBLADDER SURGERY      HYSTERECTOMY      PARTIAL    MASTECTOMY Left     SUBTOTAL HYSTERECTOMY          Social History     Tobacco Use    Smoking status: Never     Passive exposure: Never    Smokeless tobacco: Never   Substance Use Topics    Alcohol use: Not Currently     Comment: RARELY       Family History   Problem Relation Age of Onset    Heart disease Mother     Cancer Mother         Colon    Colon cancer Mother 56    Diabetes Father     Hypertension  "Father     Alcohol abuse Father     Heart disease Father     Breast cancer Sister     Cancer Sister         Breast    Cancer Daughter         Breast    Malig Hyperthermia Neg Hx         Current Outpatient Medications on File Prior to Visit   Medication Sig    albuterol sulfate  (90 Base) MCG/ACT inhaler INHALE 2 PUFFS BY MOUTH EVERY 6 HOURS AS NEEDED FOR WHEEZING.    aspirin 81 MG EC tablet aspirin 81 mg oral tablet,delayed release (DR/EC) take 1 tablet (81 mg) by oral route once daily   Active    bimatoprost (Lumigan) 0.01 % ophthalmic drops 1 drop.    brimonidine-timolol (Combigan) 0.2-0.5 % ophthalmic solution 1 drop.    coenzyme Q10 100 MG capsule Take 1 capsule by mouth Daily.    dorzolamide (TRUSOPT) 2 % ophthalmic solution     LORazepam (ATIVAN) 1 MG tablet Take 1 tablet by mouth Every 8 (Eight) Hours As Needed for Anxiety. for anxiety    Rhopressa 0.02 % solution INSTILL ONE DROP INTO BOTH EYES ONCE DAILY AT BEDTIME.    [DISCONTINUED] lisinopril-hydrochlorothiazide (PRINZIDE,ZESTORETIC) 10-12.5 MG per tablet Take 0.5 tablets by mouth Daily.    [DISCONTINUED] simvastatin (ZOCOR) 10 MG tablet Take 1 tablet by mouth Every Evening. for cholesterol     No current facility-administered medications on file prior to visit.       There are no preventive care reminders to display for this patient.    Objective     /49   Pulse 60   Ht 165.1 cm (65\")   Wt 70.3 kg (155 lb)   SpO2 100%   BMI 25.79 kg/m²       Physical Exam  Constitutional:       General: She is not in acute distress.     Appearance: Normal appearance. She is not ill-appearing.   HENT:      Head: Normocephalic and atraumatic.   Cardiovascular:      Rate and Rhythm: Normal rate and regular rhythm.      Heart sounds: Normal heart sounds. No murmur heard.  Pulmonary:      Effort: Pulmonary effort is normal. No respiratory distress.      Breath sounds: Normal breath sounds.   Chest:      Chest wall: No tenderness.   Musculoskeletal:         " General: No swelling or tenderness. Normal range of motion.      Cervical back: Normal range of motion and neck supple.   Skin:     General: Skin is warm and dry.      Findings: No rash.   Neurological:      General: No focal deficit present.      Mental Status: She is alert and oriented to person, place, and time. Mental status is at baseline.      Gait: Gait normal.   Psychiatric:         Attention and Perception: Attention normal.         Mood and Affect: Mood and affect normal.         Speech: Speech normal.         Behavior: Behavior normal. Behavior is cooperative.         Thought Content: Thought content normal. Thought content does not include suicidal ideation.         Judgment: Judgment normal.           Result Review :                           Assessment and Plan        Diagnoses and all orders for this visit:    1. Anxiety (Primary)  Comments:  stableon Ativan 1mg, continue, will update UDS and CSA and JEFFREY today  Orders:  -     POC 12 Panel Urine Drug Screen    2. Hyperlipidemia, unspecified hyperlipidemia type  Comments:  stable on Zocor 10mg, continue  Orders:  -     simvastatin (ZOCOR) 10 MG tablet; Take 1 tablet by mouth Every Evening. for cholesterol  Dispense: 90 tablet; Refill: 1    3. Primary hypertension  Comments:  stable on zestoretic 10/12.5mg, continue  Orders:  -     lisinopril-hydrochlorothiazide (PRINZIDE,ZESTORETIC) 10-12.5 MG per tablet; Take 0.5 tablets by mouth Daily.  Dispense: 45 tablet; Refill: 1    4. Encounter for screening mammogram for malignant neoplasm of breast  -     Mammo Screening Digital Tomosynthesis Bilateral With CAD; Future    5. Long term use of drug  -     POC 12 Panel Urine Drug Screen              Follow Up     Return in about 6 months (around 9/11/2025).    Patient was given instructions and counseling regarding her condition or for health maintenance advice. Please see specific information pulled into the AVS if appropriate.     Charla Holman  reports  that she has never smoked. She has never been exposed to tobacco smoke. She has never used smokeless tobacco.

## 2025-03-13 ENCOUNTER — LAB (OUTPATIENT)
Dept: LAB | Facility: HOSPITAL | Age: 70
End: 2025-03-13
Payer: MEDICARE

## 2025-03-13 DIAGNOSIS — I10 PRIMARY HYPERTENSION: ICD-10-CM

## 2025-03-13 DIAGNOSIS — E78.5 HYPERLIPIDEMIA, UNSPECIFIED HYPERLIPIDEMIA TYPE: ICD-10-CM

## 2025-03-13 DIAGNOSIS — Z13.29 SCREENING FOR THYROID DISORDER: ICD-10-CM

## 2025-03-13 LAB
ALBUMIN SERPL-MCNC: 4.3 G/DL (ref 3.5–5.2)
ALBUMIN/GLOB SERPL: 1.6 G/DL
ALP SERPL-CCNC: 71 U/L (ref 39–117)
ALT SERPL W P-5'-P-CCNC: 20 U/L (ref 1–33)
ANION GAP SERPL CALCULATED.3IONS-SCNC: 11 MMOL/L (ref 5–15)
AST SERPL-CCNC: 23 U/L (ref 1–32)
BASOPHILS # BLD AUTO: 0.03 10*3/MM3 (ref 0–0.2)
BASOPHILS NFR BLD AUTO: 0.6 % (ref 0–1.5)
BILIRUB SERPL-MCNC: 0.5 MG/DL (ref 0–1.2)
BUN SERPL-MCNC: 19 MG/DL (ref 8–23)
BUN/CREAT SERPL: 18.8 (ref 7–25)
CALCIUM SPEC-SCNC: 9.6 MG/DL (ref 8.6–10.5)
CHLORIDE SERPL-SCNC: 105 MMOL/L (ref 98–107)
CHOLEST SERPL-MCNC: 177 MG/DL (ref 0–200)
CO2 SERPL-SCNC: 24 MMOL/L (ref 22–29)
CREAT SERPL-MCNC: 1.01 MG/DL (ref 0.57–1)
DEPRECATED RDW RBC AUTO: 44.7 FL (ref 37–54)
EGFRCR SERPLBLD CKD-EPI 2021: 60 ML/MIN/1.73
EOSINOPHIL # BLD AUTO: 0.37 10*3/MM3 (ref 0–0.4)
EOSINOPHIL NFR BLD AUTO: 7.8 % (ref 0.3–6.2)
ERYTHROCYTE [DISTWIDTH] IN BLOOD BY AUTOMATED COUNT: 13.1 % (ref 12.3–15.4)
GLOBULIN UR ELPH-MCNC: 2.7 GM/DL
GLUCOSE SERPL-MCNC: 86 MG/DL (ref 65–99)
HCT VFR BLD AUTO: 39.2 % (ref 34–46.6)
HDLC SERPL-MCNC: 69 MG/DL (ref 40–60)
HGB BLD-MCNC: 13.1 G/DL (ref 12–15.9)
IMM GRANULOCYTES # BLD AUTO: 0.01 10*3/MM3 (ref 0–0.05)
IMM GRANULOCYTES NFR BLD AUTO: 0.2 % (ref 0–0.5)
LDLC SERPL CALC-MCNC: 96 MG/DL (ref 0–100)
LDLC/HDLC SERPL: 1.38 {RATIO}
LYMPHOCYTES # BLD AUTO: 1.28 10*3/MM3 (ref 0.7–3.1)
LYMPHOCYTES NFR BLD AUTO: 27.1 % (ref 19.6–45.3)
MCH RBC QN AUTO: 31.4 PG (ref 26.6–33)
MCHC RBC AUTO-ENTMCNC: 33.4 G/DL (ref 31.5–35.7)
MCV RBC AUTO: 94 FL (ref 79–97)
MONOCYTES # BLD AUTO: 0.52 10*3/MM3 (ref 0.1–0.9)
MONOCYTES NFR BLD AUTO: 11 % (ref 5–12)
NEUTROPHILS NFR BLD AUTO: 2.51 10*3/MM3 (ref 1.7–7)
NEUTROPHILS NFR BLD AUTO: 53.3 % (ref 42.7–76)
NRBC BLD AUTO-RTO: 0 /100 WBC (ref 0–0.2)
PLATELET # BLD AUTO: 249 10*3/MM3 (ref 140–450)
PMV BLD AUTO: 12 FL (ref 6–12)
POTASSIUM SERPL-SCNC: 4.1 MMOL/L (ref 3.5–5.2)
PROT SERPL-MCNC: 7 G/DL (ref 6–8.5)
RBC # BLD AUTO: 4.17 10*6/MM3 (ref 3.77–5.28)
SODIUM SERPL-SCNC: 140 MMOL/L (ref 136–145)
TRIGL SERPL-MCNC: 64 MG/DL (ref 0–150)
TSH SERPL DL<=0.05 MIU/L-ACNC: 2.15 UIU/ML (ref 0.27–4.2)
VLDLC SERPL-MCNC: 12 MG/DL (ref 5–40)
WBC NRBC COR # BLD AUTO: 4.72 10*3/MM3 (ref 3.4–10.8)

## 2025-03-13 PROCEDURE — 36415 COLL VENOUS BLD VENIPUNCTURE: CPT

## 2025-03-13 PROCEDURE — 84443 ASSAY THYROID STIM HORMONE: CPT

## 2025-03-13 PROCEDURE — 85025 COMPLETE CBC W/AUTO DIFF WBC: CPT

## 2025-03-13 PROCEDURE — 80061 LIPID PANEL: CPT

## 2025-03-13 PROCEDURE — 80053 COMPREHEN METABOLIC PANEL: CPT

## 2025-04-23 ENCOUNTER — OFFICE VISIT (OUTPATIENT)
Dept: FAMILY MEDICINE CLINIC | Facility: CLINIC | Age: 70
End: 2025-04-23
Payer: MEDICARE

## 2025-04-23 VITALS
WEIGHT: 157 LBS | HEIGHT: 65 IN | DIASTOLIC BLOOD PRESSURE: 44 MMHG | HEART RATE: 74 BPM | SYSTOLIC BLOOD PRESSURE: 108 MMHG | OXYGEN SATURATION: 97 % | BODY MASS INDEX: 26.16 KG/M2

## 2025-04-23 DIAGNOSIS — H92.12 DRAINAGE FROM LEFT EAR: ICD-10-CM

## 2025-04-23 DIAGNOSIS — H91.92 HEARING LOSS OF LEFT EAR, UNSPECIFIED HEARING LOSS TYPE: ICD-10-CM

## 2025-04-23 DIAGNOSIS — H93.8X2 EAR PRESSURE, LEFT: Primary | ICD-10-CM

## 2025-04-23 DIAGNOSIS — H93.12 TINNITUS OF LEFT EAR: ICD-10-CM

## 2025-04-23 RX ORDER — MULTIPLE VITAMINS W/ MINERALS TAB 9MG-400MCG
1 TAB ORAL DAILY
COMMUNITY

## 2025-04-23 NOTE — PROGRESS NOTES
Chief Complaint  Ear Drainage (/), Ear Pressure, and Tinnitus (/)    Subjective            Charla Holman presents to [facility]  History of Present Illness  Pt has c/o (L) ear pressure and drainage. Pt states this has been occurring for a long time but has recently become more constant. Pt states there is ringing in the ear as well and trouble hearing. Pt denies any use of q-tips or ear drops. Pt denies any blood in the drainage. PT denies any ear drainage today.        Past Medical History:   Diagnosis Date    Acute cystitis 09/19/2014    Anxiety     Asthma 02/21/2014    Breast cancer     Cancer     Glaucoma     High cholesterol 02/21/2014    Hyperlipemia     Limb swelling     Microscopic hematuria     UTI (urinary tract infection)        Allergies   Allergen Reactions    Norco [Hydrocodone-Acetaminophen] Headache    Codeine Unknown - Low Severity    Macrobid [Nitrofurantoin] Unknown - Low Severity        Past Surgical History:   Procedure Laterality Date    APPENDECTOMY  2006    BREAST SURGERY  2007    CHOLECYSTECTOMY  2000    COLONOSCOPY  01/11/2016    DR. RIVERA GODINEZ    COLONOSCOPY N/A 8/23/2021    Procedure: COLONOSCOPY WITH POLYPECTOMY;  Surgeon: Ravinder Uriarte MD;  Location: [facility] ENDOSCOPY;  Service: Gastroenterology;  Laterality: N/A;  COLON POLYPS, HEMORRHOIDS    COLONOSCOPY N/A 2/16/2024    Procedure: COLONOSCOPY WITH POLYPECTOMIES;  Surgeon: Ravinder Uriarte MD;  Location: [facility] ENDOSCOPY;  Service: Gastroenterology;  Laterality: N/A;  COLON POLYPS, DIVERTICULOSIS    EYE SURGERY      EYE IMPLANT    GALLBLADDER SURGERY      HYSTERECTOMY      PARTIAL    MASTECTOMY Left     SUBTOTAL HYSTERECTOMY          Social History     Tobacco Use    Smoking status: Never     Passive exposure: Never    Smokeless tobacco: Never   Substance Use Topics    Alcohol use: Not Currently     Comment: RARELY       Family History   Problem Relation Age of Onset    Heart disease  "Mother     Cancer Mother         Colon    Colon cancer Mother 56    Diabetes Father     Hypertension Father     Alcohol abuse Father     Heart disease Father     Breast cancer Sister     Cancer Sister         Breast    Cancer Daughter         Breast    Malig Hyperthermia Neg Hx         Current Outpatient Medications on File Prior to Visit   Medication Sig    albuterol sulfate  (90 Base) MCG/ACT inhaler INHALE 2 PUFFS BY MOUTH EVERY 6 HOURS AS NEEDED FOR WHEEZING.    aspirin 81 MG EC tablet aspirin 81 mg oral tablet,delayed release (DR/EC) take 1 tablet (81 mg) by oral route once daily   Active    bimatoprost (Lumigan) 0.01 % ophthalmic drops 1 drop.    brimonidine-timolol (Combigan) 0.2-0.5 % ophthalmic solution 1 drop.    coenzyme Q10 100 MG capsule Take 1 capsule by mouth Daily.    dorzolamide (TRUSOPT) 2 % ophthalmic solution     lisinopril-hydrochlorothiazide (PRINZIDE,ZESTORETIC) 10-12.5 MG per tablet Take 0.5 tablets by mouth Daily.    LORazepam (ATIVAN) 1 MG tablet Take 1 tablet by mouth Every 8 (Eight) Hours As Needed for Anxiety. for anxiety    multivitamin with minerals (PRESERVISION AREDS PO) Take 1 tablet by mouth Daily.    multivitamin with minerals tablet tablet Take 1 tablet by mouth Daily.    Rhopressa 0.02 % solution INSTILL ONE DROP INTO BOTH EYES ONCE DAILY AT BEDTIME.    simvastatin (ZOCOR) 10 MG tablet Take 1 tablet by mouth Every Evening. for cholesterol     No current facility-administered medications on file prior to visit.       There are no preventive care reminders to display for this patient.    Objective     /44   Pulse 74   Ht 165.1 cm (65\")   Wt 71.2 kg (157 lb)   SpO2 97%   BMI 26.13 kg/m²       Physical Exam  Constitutional:       General: She is not in acute distress.     Appearance: Normal appearance. She is not ill-appearing.   HENT:      Head: Normocephalic and atraumatic.      Right Ear: Tympanic membrane, ear canal and external ear normal.      Left Ear: " Tympanic membrane, ear canal and external ear normal.      Nose: Nose normal.   Cardiovascular:      Rate and Rhythm: Normal rate and regular rhythm.      Heart sounds: Normal heart sounds. No murmur heard.  Pulmonary:      Effort: Pulmonary effort is normal. No respiratory distress.      Breath sounds: Normal breath sounds.   Chest:      Chest wall: No tenderness.   Musculoskeletal:         General: No swelling or tenderness. Normal range of motion.      Cervical back: Normal range of motion and neck supple.   Skin:     General: Skin is warm and dry.      Findings: No rash.   Neurological:      General: No focal deficit present.      Mental Status: She is alert and oriented to person, place, and time. Mental status is at baseline.      Gait: Gait normal.   Psychiatric:         Mood and Affect: Mood normal.         Behavior: Behavior normal.         Thought Content: Thought content normal.         Judgment: Judgment normal.           Result Review :                           Assessment and Plan        Diagnoses and all orders for this visit:    1. Ear pressure, left (Primary)  Comments:  advised flonase OTC and continue daily antihistamine  Orders:  -     Ambulatory Referral to ENT (Otolaryngology)    2. Tinnitus of left ear  Comments:  advised Melatonin OTC daily  Orders:  -     Ambulatory Referral to ENT (Otolaryngology)    3. Drainage from left ear  Comments:  advised flonase OTC and continue daily antihistamine  Orders:  -     Ambulatory Referral to ENT (Otolaryngology)    4. Hearing loss of left ear, unspecified hearing loss type  -     Ambulatory Referral to ENT (Otolaryngology)              Follow Up     Return if symptoms worsen or fail to improve.    Patient was given instructions and counseling regarding her condition or for health maintenance advice. Please see specific information pulled into the AVS if appropriate.     Charla Holman  reports that she has never smoked. She has never been exposed to  tobacco smoke. She has never used smokeless tobacco.

## 2025-05-27 ENCOUNTER — OFFICE VISIT (OUTPATIENT)
Dept: FAMILY MEDICINE CLINIC | Facility: CLINIC | Age: 70
End: 2025-05-27
Payer: MEDICARE

## 2025-05-27 VITALS
BODY MASS INDEX: 25.49 KG/M2 | HEART RATE: 66 BPM | DIASTOLIC BLOOD PRESSURE: 50 MMHG | WEIGHT: 153 LBS | HEIGHT: 65 IN | SYSTOLIC BLOOD PRESSURE: 120 MMHG | OXYGEN SATURATION: 99 %

## 2025-05-27 DIAGNOSIS — B02.9 HERPES ZOSTER WITHOUT COMPLICATION: Primary | ICD-10-CM

## 2025-05-27 PROBLEM — R09.1 PLEURISY: Status: ACTIVE | Noted: 2025-05-27

## 2025-05-27 PROCEDURE — 1160F RVW MEDS BY RX/DR IN RCRD: CPT | Performed by: NURSE PRACTITIONER

## 2025-05-27 PROCEDURE — 1126F AMNT PAIN NOTED NONE PRSNT: CPT | Performed by: NURSE PRACTITIONER

## 2025-05-27 PROCEDURE — 1159F MED LIST DOCD IN RCRD: CPT | Performed by: NURSE PRACTITIONER

## 2025-05-27 PROCEDURE — 99214 OFFICE O/P EST MOD 30 MIN: CPT | Performed by: NURSE PRACTITIONER

## 2025-05-27 RX ORDER — VALACYCLOVIR HYDROCHLORIDE 1 G/1
1000 TABLET, FILM COATED ORAL 3 TIMES DAILY
Qty: 21 TABLET | Refills: 0 | Status: SHIPPED | OUTPATIENT
Start: 2025-05-27 | End: 2025-06-03

## 2025-05-27 RX ORDER — METHYLPREDNISOLONE 4 MG/1
TABLET ORAL
Qty: 21 EACH | Refills: 0 | Status: SHIPPED | OUTPATIENT
Start: 2025-05-27

## 2025-05-27 NOTE — PROGRESS NOTES
Chief Complaint  Rib Pain    Subjective            Charla Holman presents to [facility]  History of Present Illness  Pt has c/o back wrapping around rib cageon (L) side. Pt states the pain is with movement and upon palpation.  She describes the pain as a burning shooting pain.  She denies SOA or trouble breathing. Pt states this has been present for approximately 2 wks. Pt thought this was a pulled muscle at the time. Pt did rest, heat, and aleve with little relief. Pt states it was starting to feel better, but got worse with movements.         Past Medical History:   Diagnosis Date    Acute cystitis 09/19/2014    Anxiety     Asthma 02/21/2014    Breast cancer     Cancer     Glaucoma     High cholesterol 02/21/2014    Hyperlipemia     Limb swelling     Microscopic hematuria     UTI (urinary tract infection)        Allergies   Allergen Reactions    Norco [Hydrocodone-Acetaminophen] Headache    Codeine Unknown - Low Severity    Macrobid [Nitrofurantoin] Unknown - Low Severity        Past Surgical History:   Procedure Laterality Date    APPENDECTOMY  2006    BREAST SURGERY  2007    CHOLECYSTECTOMY  2000    COLONOSCOPY  01/11/2016    DR. RIVERA GODINEZ    COLONOSCOPY N/A 8/23/2021    Procedure: COLONOSCOPY WITH POLYPECTOMY;  Surgeon: Ravinder Uriarte MD;  Location: [facility] ENDOSCOPY;  Service: Gastroenterology;  Laterality: N/A;  COLON POLYPS, HEMORRHOIDS    COLONOSCOPY N/A 2/16/2024    Procedure: COLONOSCOPY WITH POLYPECTOMIES;  Surgeon: Ravinder Uriarte MD;  Location: [facility] ENDOSCOPY;  Service: Gastroenterology;  Laterality: N/A;  COLON POLYPS, DIVERTICULOSIS    EYE SURGERY      EYE IMPLANT    GALLBLADDER SURGERY      HYSTERECTOMY      PARTIAL    MASTECTOMY Left     SUBTOTAL HYSTERECTOMY          Social History     Tobacco Use    Smoking status: Never     Passive exposure: Never    Smokeless tobacco: Never   Substance Use Topics    Alcohol use: Not Currently     Comment:  "RARELY       Family History   Problem Relation Age of Onset    Heart disease Mother     Cancer Mother         Colon    Colon cancer Mother 56    Diabetes Father     Hypertension Father     Alcohol abuse Father     Heart disease Father     Breast cancer Sister     Cancer Sister         Breast    Cancer Daughter         Breast    Malig Hyperthermia Neg Hx         Current Outpatient Medications on File Prior to Visit   Medication Sig    albuterol sulfate  (90 Base) MCG/ACT inhaler INHALE 2 PUFFS BY MOUTH EVERY 6 HOURS AS NEEDED FOR WHEEZING.    aspirin 81 MG EC tablet aspirin 81 mg oral tablet,delayed release (DR/EC) take 1 tablet (81 mg) by oral route once daily   Active    bimatoprost (Lumigan) 0.01 % ophthalmic drops 1 drop.    brimonidine-timolol (Combigan) 0.2-0.5 % ophthalmic solution 1 drop.    coenzyme Q10 100 MG capsule Take 1 capsule by mouth Daily.    dorzolamide (TRUSOPT) 2 % ophthalmic solution     lisinopril-hydrochlorothiazide (PRINZIDE,ZESTORETIC) 10-12.5 MG per tablet Take 0.5 tablets by mouth Daily.    LORazepam (ATIVAN) 1 MG tablet Take 1 tablet by mouth Every 8 (Eight) Hours As Needed for Anxiety. for anxiety    multivitamin with minerals (PRESERVISION AREDS PO) Take 1 tablet by mouth Daily.    multivitamin with minerals tablet tablet Take 1 tablet by mouth Daily.    simvastatin (ZOCOR) 10 MG tablet Take 1 tablet by mouth Every Evening. for cholesterol    [DISCONTINUED] Rhopressa 0.02 % solution INSTILL ONE DROP INTO BOTH EYES ONCE DAILY AT BEDTIME. (Patient not taking: Reported on 5/27/2025)     No current facility-administered medications on file prior to visit.       Health Maintenance Due   Topic Date Due    COVID-19 Vaccine (10 - 2024-25 season) 05/08/2025       Objective     /50   Pulse 66   Ht 165.1 cm (65\")   Wt 69.4 kg (153 lb)   SpO2 99%   BMI 25.46 kg/m²       Physical Exam  Constitutional:       General: She is not in acute distress.     Appearance: Normal appearance. " She is not ill-appearing.   HENT:      Head: Normocephalic and atraumatic.   Cardiovascular:      Rate and Rhythm: Normal rate and regular rhythm.      Heart sounds: Normal heart sounds. No murmur heard.  Pulmonary:      Effort: Pulmonary effort is normal. No respiratory distress.      Breath sounds: Normal breath sounds.   Chest:      Chest wall: Tenderness present.      Comments: Tenderness upon palpation on left lower rib margin wrapping around to back, no rash noted  Musculoskeletal:         General: No swelling or tenderness. Normal range of motion.      Cervical back: Normal range of motion and neck supple.   Skin:     General: Skin is warm and dry.      Findings: No rash.   Neurological:      General: No focal deficit present.      Mental Status: She is alert and oriented to person, place, and time. Mental status is at baseline.      Gait: Gait normal.   Psychiatric:         Mood and Affect: Mood normal.         Behavior: Behavior normal.         Thought Content: Thought content normal.         Judgment: Judgment normal.           Result Review :                           Assessment and Plan        Diagnoses and all orders for this visit:    1. Herpes zoster without complication (Primary)  -     methylPREDNISolone (MEDROL) 4 MG dose pack; Take as directed on package instructions.  Dispense: 21 each; Refill: 0  -     valACYclovir (Valtrex) 1000 MG tablet; Take 1 tablet by mouth 3 (Three) Times a Day for 7 days.  Dispense: 21 tablet; Refill: 0              Follow Up     Return if symptoms worsen or fail to improve.    Patient was given instructions and counseling regarding her condition or for health maintenance advice. Please see specific information pulled into the AVS if appropriate.     Charla Holman  reports that she has never smoked. She has never been exposed to tobacco smoke. She has never used smokeless tobacco.

## 2025-06-25 ENCOUNTER — HOSPITAL ENCOUNTER (OUTPATIENT)
Dept: MAMMOGRAPHY | Facility: HOSPITAL | Age: 70
Discharge: HOME | End: 2025-06-25
Admitting: NURSE PRACTITIONER
Payer: MEDICARE

## 2025-06-25 DIAGNOSIS — Z12.31 ENCOUNTER FOR SCREENING MAMMOGRAM FOR MALIGNANT NEOPLASM OF BREAST: ICD-10-CM

## 2025-06-25 PROCEDURE — 77063 BREAST TOMOSYNTHESIS BI: CPT

## 2025-06-25 PROCEDURE — 77067 SCR MAMMO BI INCL CAD: CPT

## (undated) DEVICE — COLON KIT: Brand: MEDLINE INDUSTRIES, INC.

## (undated) DEVICE — KT VLV BIOP DEFENDO SXN AIR/WATER

## (undated) DEVICE — LINER SURG CANSTR SXN S/RIGD 1500CC

## (undated) DEVICE — SNAR POLYP CAPTIFLEX XS/OVL 11X2.4MM 240CM 1P/U

## (undated) DEVICE — TRAP POLYP ETRAP 2PK

## (undated) DEVICE — SOLIDIFIER LIQLOC PLS 1500CC BT

## (undated) DEVICE — SOL IRRG H2O PL/BG 1000ML STRL

## (undated) DEVICE — Device: Brand: DEFENDO AIR/WATER/SUCTION AND BIOPSY VALVE

## (undated) DEVICE — CONN JET HYDRA H20 AUXILIARY DISP

## (undated) DEVICE — Device

## (undated) DEVICE — THE SINGLE USE ETRAP – POLYP TRAP IS USED FOR SUCTION RETRIEVAL OF ENDOSCOPICALLY REMOVED POLYPS.: Brand: ETRAP